# Patient Record
Sex: FEMALE | Race: WHITE | NOT HISPANIC OR LATINO | Employment: FULL TIME | URBAN - METROPOLITAN AREA
[De-identification: names, ages, dates, MRNs, and addresses within clinical notes are randomized per-mention and may not be internally consistent; named-entity substitution may affect disease eponyms.]

---

## 2018-06-02 ENCOUNTER — HOSPITAL ENCOUNTER (INPATIENT)
Facility: HOSPITAL | Age: 28
LOS: 3 days | Discharge: HOME | DRG: 440 | End: 2018-06-05
Attending: EMERGENCY MEDICINE | Admitting: INTERNAL MEDICINE
Payer: COMMERCIAL

## 2018-06-02 DIAGNOSIS — K85.90 ACUTE PANCREATITIS: Primary | ICD-10-CM

## 2018-06-02 PROBLEM — E87.6 HYPOKALEMIA: Status: ACTIVE | Noted: 2018-06-02

## 2018-06-02 LAB
ALBUMIN SERPL BCP-MCNC: 3.9 G/DL (ref 3.5–5)
ALP SERPL-CCNC: 78 U/L (ref 46–116)
ALT SERPL W P-5'-P-CCNC: 44 U/L (ref 12–78)
ANION GAP SERPL CALCULATED.3IONS-SCNC: 19 MMOL/L (ref 4–13)
AST SERPL W P-5'-P-CCNC: 51 U/L (ref 5–45)
BASOPHILS # BLD AUTO: 0.07 THOUSANDS/ΜL (ref 0–0.1)
BASOPHILS NFR BLD AUTO: 1 % (ref 0–1)
BILIRUB SERPL-MCNC: 0.5 MG/DL (ref 0.2–1)
BUN SERPL-MCNC: 24 MG/DL (ref 5–25)
CALCIUM SERPL-MCNC: 9.5 MG/DL (ref 8.3–10.1)
CHLORIDE SERPL-SCNC: 101 MMOL/L (ref 100–108)
CLARITY, POC: CLEAR
CO2 SERPL-SCNC: 21 MMOL/L (ref 21–32)
COLOR, POC: NORMAL
CREAT SERPL-MCNC: 0.98 MG/DL (ref 0.6–1.3)
EOSINOPHIL # BLD AUTO: 0.14 THOUSAND/ΜL (ref 0–0.61)
EOSINOPHIL NFR BLD AUTO: 1 % (ref 0–6)
ERYTHROCYTE [DISTWIDTH] IN BLOOD BY AUTOMATED COUNT: 14.6 % (ref 11.6–15.1)
EXT BILIRUBIN, UA: NORMAL
EXT BLOOD URINE: NORMAL
EXT GLUCOSE, UA: NORMAL
EXT KETONES: NORMAL
EXT NITRITE, UA: NORMAL
EXT PH, UA: 6.5
EXT PREG TEST URINE: NEGATIVE
EXT PROTEIN, UA: NORMAL
EXT SPECIFIC GRAVITY, UA: 1.01
EXT UROBILINOGEN: 0.2
GFR SERPL CREATININE-BSD FRML MDRD: 79 ML/MIN/1.73SQ M
GLUCOSE SERPL-MCNC: 130 MG/DL (ref 65–140)
HCT VFR BLD AUTO: 39.9 % (ref 34.8–46.1)
HGB BLD-MCNC: 13.1 G/DL (ref 11.5–15.4)
IMM GRANULOCYTES # BLD AUTO: 0.03 THOUSAND/UL (ref 0–0.2)
IMM GRANULOCYTES NFR BLD AUTO: 0 % (ref 0–2)
LIPASE SERPL-CCNC: ABNORMAL U/L (ref 73–393)
LYMPHOCYTES # BLD AUTO: 4.61 THOUSANDS/ΜL (ref 0.6–4.47)
LYMPHOCYTES NFR BLD AUTO: 34 % (ref 14–44)
MCH RBC QN AUTO: 30.9 PG (ref 26.8–34.3)
MCHC RBC AUTO-ENTMCNC: 32.8 G/DL (ref 31.4–37.4)
MCV RBC AUTO: 94 FL (ref 82–98)
MONOCYTES # BLD AUTO: 0.6 THOUSAND/ΜL (ref 0.17–1.22)
MONOCYTES NFR BLD AUTO: 4 % (ref 4–12)
NEUTROPHILS # BLD AUTO: 8.09 THOUSANDS/ΜL (ref 1.85–7.62)
NEUTS SEG NFR BLD AUTO: 60 % (ref 43–75)
NRBC BLD AUTO-RTO: 0 /100 WBCS
PLATELET # BLD AUTO: 541 THOUSANDS/UL (ref 149–390)
PMV BLD AUTO: 12.2 FL (ref 8.9–12.7)
POTASSIUM SERPL-SCNC: 3.1 MMOL/L (ref 3.5–5.3)
PROT SERPL-MCNC: 8.1 G/DL (ref 6.4–8.2)
RBC # BLD AUTO: 4.24 MILLION/UL (ref 3.81–5.12)
SODIUM SERPL-SCNC: 141 MMOL/L (ref 136–145)
WBC # BLD AUTO: 13.54 THOUSAND/UL (ref 4.31–10.16)
WBC # BLD EST: NORMAL 10*3/UL

## 2018-06-02 PROCEDURE — 81025 URINE PREGNANCY TEST: CPT | Performed by: EMERGENCY MEDICINE

## 2018-06-02 PROCEDURE — 99285 EMERGENCY DEPT VISIT HI MDM: CPT

## 2018-06-02 PROCEDURE — 99223 1ST HOSP IP/OBS HIGH 75: CPT | Performed by: NURSE PRACTITIONER

## 2018-06-02 PROCEDURE — 85025 COMPLETE CBC W/AUTO DIFF WBC: CPT | Performed by: EMERGENCY MEDICINE

## 2018-06-02 PROCEDURE — 81002 URINALYSIS NONAUTO W/O SCOPE: CPT | Performed by: EMERGENCY MEDICINE

## 2018-06-02 PROCEDURE — 80053 COMPREHEN METABOLIC PANEL: CPT | Performed by: EMERGENCY MEDICINE

## 2018-06-02 PROCEDURE — 36415 COLL VENOUS BLD VENIPUNCTURE: CPT

## 2018-06-02 PROCEDURE — 96374 THER/PROPH/DIAG INJ IV PUSH: CPT

## 2018-06-02 PROCEDURE — 83690 ASSAY OF LIPASE: CPT | Performed by: EMERGENCY MEDICINE

## 2018-06-02 RX ORDER — SODIUM CHLORIDE 9 MG/ML
150 INJECTION, SOLUTION INTRAVENOUS CONTINUOUS
Status: DISCONTINUED | OUTPATIENT
Start: 2018-06-02 | End: 2018-06-04

## 2018-06-02 RX ORDER — MELATONIN
1000 DAILY
Status: DISCONTINUED | OUTPATIENT
Start: 2018-06-03 | End: 2018-06-05 | Stop reason: HOSPADM

## 2018-06-02 RX ORDER — POTASSIUM CHLORIDE 14.9 MG/ML
20 INJECTION INTRAVENOUS
Status: COMPLETED | OUTPATIENT
Start: 2018-06-02 | End: 2018-06-04

## 2018-06-02 RX ORDER — ONDANSETRON 2 MG/ML
4 INJECTION INTRAMUSCULAR; INTRAVENOUS ONCE
Status: COMPLETED | OUTPATIENT
Start: 2018-06-02 | End: 2018-06-02

## 2018-06-02 RX ORDER — ONDANSETRON 2 MG/ML
4 INJECTION INTRAMUSCULAR; INTRAVENOUS EVERY 6 HOURS PRN
Status: DISCONTINUED | OUTPATIENT
Start: 2018-06-02 | End: 2018-06-05 | Stop reason: HOSPADM

## 2018-06-02 RX ORDER — TOPIRAMATE 25 MG/1
25 TABLET ORAL 2 TIMES DAILY
Status: DISCONTINUED | OUTPATIENT
Start: 2018-06-03 | End: 2018-06-04

## 2018-06-02 RX ORDER — TOPIRAMATE 100 MG/1
25 TABLET, FILM COATED ORAL 2 TIMES DAILY
COMMUNITY
End: 2018-06-05 | Stop reason: HOSPADM

## 2018-06-02 RX ORDER — DROSPIRENONE AND ETHINYL ESTRADIOL 0.02-3(28)
1 KIT ORAL DAILY
COMMUNITY
End: 2018-08-10 | Stop reason: HOSPADM

## 2018-06-02 RX ADMIN — ONDANSETRON 4 MG: 2 INJECTION, SOLUTION INTRAMUSCULAR; INTRAVENOUS at 21:56

## 2018-06-02 RX ADMIN — POTASSIUM CHLORIDE 20 MEQ: 200 INJECTION, SOLUTION INTRAVENOUS at 23:45

## 2018-06-02 RX ADMIN — SODIUM CHLORIDE 250 ML/HR: 0.9 INJECTION, SOLUTION INTRAVENOUS at 23:45

## 2018-06-02 RX ADMIN — ONDANSETRON 4 MG: 2 INJECTION, SOLUTION INTRAMUSCULAR; INTRAVENOUS at 19:48

## 2018-06-02 RX ADMIN — HYDROMORPHONE HYDROCHLORIDE 0.5 MG: 1 INJECTION, SOLUTION INTRAMUSCULAR; INTRAVENOUS; SUBCUTANEOUS at 21:56

## 2018-06-02 NOTE — ED PROVIDER NOTES
History  Chief Complaint   Patient presents with    Abdominal Pain     Pt presents to ED with central, upper abdominal pain  Pain started an hour and a half ago after eating  Pt reports N/V  Pt rates pain at 8 and states "it feels sharp "      This 52-year-old female complains of transient epigastric sharp pain that began about 1 hour ago  It is now resolved  She had nausea with the pain and had 1 episode of nonbloody vomiting after the pain started  He has no change in bowel movements  Last normal bowel movement was this afternoon  She denies recent fever cough dyspnea diarrhea dysuria and any other recent illness  She states the pain reminds her of an episode of pancreatitis she had 3 years ago  At that time she was told she has gallstones as well  She has not had these symptoms since then, until today            Prior to Admission Medications   Prescriptions Last Dose Informant Patient Reported? Taking? cholecalciferol (VITAMIN D3) 1,000 units tablet   Yes Yes   Sig: Take 1,000 Units by mouth daily   topiramate (TOPAMAX) 100 mg tablet   Yes Yes   Sig: Take 25 mg by mouth 2 (two) times a day      Facility-Administered Medications: None       History reviewed  No pertinent past medical history  History reviewed  No pertinent surgical history  History reviewed  No pertinent family history  I have reviewed and agree with the history as documented  Social History   Substance Use Topics    Smoking status: Never Smoker    Smokeless tobacco: Never Used    Alcohol use No        Review of Systems   Constitutional: Negative  HENT: Negative  Eyes: Negative  Respiratory: Negative  Cardiovascular: Negative  Gastrointestinal: Negative  Endocrine: Negative  Genitourinary: Negative  Musculoskeletal: Negative  Skin: Negative  Allergic/Immunologic: Negative  Neurological: Negative  Hematological: Negative  Psychiatric/Behavioral: Negative      All other systems reviewed and are negative  Physical Exam  Physical Exam   Constitutional: She is oriented to person, place, and time  She appears well-developed and well-nourished  HENT:   Head: Normocephalic and atraumatic  Eyes: Conjunctivae and EOM are normal  Pupils are equal, round, and reactive to light  Neck: Normal range of motion  Neck supple  Cardiovascular: Normal rate and regular rhythm  No murmur heard  Pulmonary/Chest: Effort normal and breath sounds normal    Abdominal: Soft  Bowel sounds are normal  She exhibits no distension and no mass  Tenderness:  very minimal epigastric tenderness  There is no rebound and no guarding  No hernia  Musculoskeletal: Normal range of motion  She exhibits no edema  Neurological: She is alert and oriented to person, place, and time  She has normal reflexes  No cranial nerve deficit  Coordination normal    Skin: Skin is warm and dry  No rash noted  Psychiatric: She has a normal mood and affect  Nursing note and vitals reviewed        Vital Signs  ED Triage Vitals   Temperature Pulse Respirations Blood Pressure SpO2   06/02/18 1927 06/02/18 1927 06/02/18 1927 06/02/18 1927 06/02/18 1927   (!) 97 1 °F (36 2 °C) 70 18 121/73 98 %      Temp Source Heart Rate Source Patient Position - Orthostatic VS BP Location FiO2 (%)   06/02/18 1927 06/02/18 2100 -- -- --   Temporal Monitor         Pain Score       06/02/18 2016       8           Vitals:    06/02/18 1930 06/02/18 2000 06/02/18 2030 06/02/18 2100   BP: 121/73 118/70 120/80 118/75   Pulse: 70 72 75 70       Visual Acuity  Visual Acuity      Most Recent Value   L Pupil Size (mm)  3   R Pupil Size (mm)  3          ED Medications  Medications   HYDROmorphone (DILAUDID) injection 0 5 mg (not administered)   ondansetron (ZOFRAN) injection 4 mg (not administered)   ondansetron (ZOFRAN) injection 4 mg (4 mg Intravenous Given 6/2/18 1948)       Diagnostic Studies  Results Reviewed     Procedure Component Value Units Date/Time    Lipase [[id number]]  (Abnormal) Collected:  06/02/18 1941    Lab Status:  Final result Specimen:  Blood from Arm, Left Updated:  06/02/18 2108     Lipase 26,408 (H) u/L     Comprehensive metabolic panel [[id number]]  (Abnormal) Collected:  06/02/18 1941    Lab Status:  Final result Specimen:  Blood from Arm, Left Updated:  06/02/18 2045     Sodium 141 mmol/L      Potassium 3 1 (L) mmol/L      Chloride 101 mmol/L      CO2 21 mmol/L      Anion Gap 19 (H) mmol/L      BUN 24 mg/dL      Creatinine 0 98 mg/dL      Glucose 130 mg/dL      Calcium 9 5 mg/dL      AST 51 (H) U/L      ALT 44 U/L      Alkaline Phosphatase 78 U/L      Total Protein 8 1 g/dL      Albumin 3 9 g/dL      Total Bilirubin 0 50 mg/dL      eGFR 79 ml/min/1 73sq m     Narrative:         National Kidney Disease Education Program recommendations are as follows:  GFR calculation is accurate only with a steady state creatinine  Chronic Kidney disease less than 60 ml/min/1 73 sq  meters  Kidney failure less than 15 ml/min/1 73 sq  meters      CBC and differential [[id number]]  (Abnormal) Collected:  06/02/18 1941    Lab Status:  Final result Specimen:  Blood from Arm, Left Updated:  06/02/18 2028     WBC 13 54 (H) Thousand/uL      RBC 4 24 Million/uL      Hemoglobin 13 1 g/dL      Hematocrit 39 9 %      MCV 94 fL      MCH 30 9 pg      MCHC 32 8 g/dL      RDW 14 6 %      MPV 12 2 fL      Platelets 115 (H) Thousands/uL      nRBC 0 /100 WBCs      Neutrophils Relative 60 %      Immat GRANS % 0 %      Lymphocytes Relative 34 %      Monocytes Relative 4 %      Eosinophils Relative 1 %      Basophils Relative 1 %      Neutrophils Absolute 8 09 (H) Thousands/µL      Immature Grans Absolute 0 03 Thousand/uL      Lymphocytes Absolute 4 61 (H) Thousands/µL      Monocytes Absolute 0 60 Thousand/µL      Eosinophils Absolute 0 14 Thousand/µL      Basophils Absolute 0 07 Thousands/µL     POCT urinalysis dipstick [[id number]]  (Normal) Resulted:  06/02/18 1953    Lab Status:  Final result Specimen:  Urine from Urine, Other Updated:  06/02/18 1954     Color, UA Hanna     Clarity, UA Clear     EXT Glucose, UA (Ref: Negative) Neg     EXT Bilirubin, UA (Ref: Negative) Neg     EXT Ketones, UA (Ref: Negative) Trace 5     EXT Spec Grav, UA 1 015     EXT Blood, UA (Ref: Negative) Trace     EXT pH, UA 6 5     EXT Protein, UA (Ref: Negative) Trace     EXT Urobilinogen, UA (Ref: 0 2- 1 0) 0 2     EXT Leukocytes, UA (Ref: Negative) Neg     EXT Nitrite, UA (Ref: Negative) Neg    POCT pregnancy, urine [[id number]]  (Normal) Resulted:  06/02/18 1953    Lab Status:  Final result Specimen:  Urine Updated:  06/02/18 1953     EXT PREG TEST UR (Ref: Negative) Negative                 No orders to display              Procedures  Procedures       Phone Contacts  ED Phone Contact    ED Course  ED Course as of Jun 02 2151   Sat Jun 02, 2018 2144   Pain is fluctuating  It was up to 8/10 and now she says it is approximately 3/10  She has nausea as well  I will admit her due to her symptoms and her elevated lipase                                MDM  Number of Diagnoses or Management Options  Acute pancreatitis: new and requires workup     Amount and/or Complexity of Data Reviewed  Clinical lab tests: ordered and reviewed  Discuss the patient with other providers: yes      CritCare Time    Disposition  Final diagnoses:   Acute pancreatitis     Time reflects when diagnosis was documented in both MDM as applicable and the Disposition within this note     Time User Action Codes Description Comment    6/2/2018  9:49 PM Dada Casillas Add [K85 90] Acute pancreatitis       ED Disposition     ED Disposition Condition Comment    Admit  Case was discussed with  NP and the patient's admission status was agreed to be Admission Status: inpatient status to the service of Dr Solmon Schwab           Follow-up Information    None         Patient's Medications   Discharge Prescriptions    No medications on file     No discharge procedures on file      ED Provider  Electronically Signed by           Sharmila Jensen DO  06/02/18 4536

## 2018-06-03 ENCOUNTER — APPOINTMENT (INPATIENT)
Dept: CT IMAGING | Facility: HOSPITAL | Age: 28
DRG: 440 | End: 2018-06-03
Payer: COMMERCIAL

## 2018-06-03 LAB
ANION GAP SERPL CALCULATED.3IONS-SCNC: 12 MMOL/L (ref 4–13)
BASOPHILS # BLD AUTO: 0.02 THOUSANDS/ΜL (ref 0–0.1)
BASOPHILS NFR BLD AUTO: 0 % (ref 0–1)
BUN SERPL-MCNC: 13 MG/DL (ref 5–25)
CALCIUM SERPL-MCNC: 8.6 MG/DL (ref 8.3–10.1)
CHLORIDE SERPL-SCNC: 109 MMOL/L (ref 100–108)
CO2 SERPL-SCNC: 20 MMOL/L (ref 21–32)
CREAT SERPL-MCNC: 0.83 MG/DL (ref 0.6–1.3)
EOSINOPHIL # BLD AUTO: 0.01 THOUSAND/ΜL (ref 0–0.61)
EOSINOPHIL NFR BLD AUTO: 0 % (ref 0–6)
ERYTHROCYTE [DISTWIDTH] IN BLOOD BY AUTOMATED COUNT: 14.4 % (ref 11.6–15.1)
GFR SERPL CREATININE-BSD FRML MDRD: 97 ML/MIN/1.73SQ M
GLUCOSE SERPL-MCNC: 101 MG/DL (ref 65–140)
GLUCOSE SERPL-MCNC: 108 MG/DL (ref 65–140)
GLUCOSE SERPL-MCNC: 66 MG/DL (ref 65–140)
GLUCOSE SERPL-MCNC: 74 MG/DL (ref 65–140)
GLUCOSE SERPL-MCNC: 76 MG/DL (ref 65–140)
GLUCOSE SERPL-MCNC: 88 MG/DL (ref 65–140)
HCT VFR BLD AUTO: 37.2 % (ref 34.8–46.1)
HGB BLD-MCNC: 12 G/DL (ref 11.5–15.4)
IMM GRANULOCYTES # BLD AUTO: 0.03 THOUSAND/UL (ref 0–0.2)
IMM GRANULOCYTES NFR BLD AUTO: 0 % (ref 0–2)
LYMPHOCYTES # BLD AUTO: 2.36 THOUSANDS/ΜL (ref 0.6–4.47)
LYMPHOCYTES NFR BLD AUTO: 20 % (ref 14–44)
MCH RBC QN AUTO: 30.8 PG (ref 26.8–34.3)
MCHC RBC AUTO-ENTMCNC: 32.3 G/DL (ref 31.4–37.4)
MCV RBC AUTO: 96 FL (ref 82–98)
MONOCYTES # BLD AUTO: 0.42 THOUSAND/ΜL (ref 0.17–1.22)
MONOCYTES NFR BLD AUTO: 4 % (ref 4–12)
NEUTROPHILS # BLD AUTO: 9.07 THOUSANDS/ΜL (ref 1.85–7.62)
NEUTS SEG NFR BLD AUTO: 76 % (ref 43–75)
NRBC BLD AUTO-RTO: 0 /100 WBCS
PLATELET # BLD AUTO: 419 THOUSANDS/UL (ref 149–390)
PMV BLD AUTO: 11.8 FL (ref 8.9–12.7)
POTASSIUM SERPL-SCNC: 4.3 MMOL/L (ref 3.5–5.3)
RBC # BLD AUTO: 3.89 MILLION/UL (ref 3.81–5.12)
SODIUM SERPL-SCNC: 141 MMOL/L (ref 136–145)
TRIGL SERPL-MCNC: 208 MG/DL
WBC # BLD AUTO: 11.91 THOUSAND/UL (ref 4.31–10.16)

## 2018-06-03 PROCEDURE — 74177 CT ABD & PELVIS W/CONTRAST: CPT

## 2018-06-03 PROCEDURE — 85025 COMPLETE CBC W/AUTO DIFF WBC: CPT | Performed by: NURSE PRACTITIONER

## 2018-06-03 PROCEDURE — C9113 INJ PANTOPRAZOLE SODIUM, VIA: HCPCS | Performed by: INTERNAL MEDICINE

## 2018-06-03 PROCEDURE — 82948 REAGENT STRIP/BLOOD GLUCOSE: CPT

## 2018-06-03 PROCEDURE — 80048 BASIC METABOLIC PNL TOTAL CA: CPT | Performed by: NURSE PRACTITIONER

## 2018-06-03 PROCEDURE — 84478 ASSAY OF TRIGLYCERIDES: CPT | Performed by: NURSE PRACTITIONER

## 2018-06-03 PROCEDURE — 99232 SBSQ HOSP IP/OBS MODERATE 35: CPT | Performed by: INTERNAL MEDICINE

## 2018-06-03 RX ORDER — MORPHINE SULFATE 2 MG/ML
2 INJECTION, SOLUTION INTRAMUSCULAR; INTRAVENOUS
Status: DISCONTINUED | OUTPATIENT
Start: 2018-06-03 | End: 2018-06-05 | Stop reason: HOSPADM

## 2018-06-03 RX ORDER — PANTOPRAZOLE SODIUM 40 MG/1
40 INJECTION, POWDER, FOR SOLUTION INTRAVENOUS
Status: DISCONTINUED | OUTPATIENT
Start: 2018-06-03 | End: 2018-06-04

## 2018-06-03 RX ADMIN — TOPIRAMATE 25 MG: 25 TABLET, FILM COATED ORAL at 09:00

## 2018-06-03 RX ADMIN — HYDROMORPHONE HYDROCHLORIDE 1 MG: 1 INJECTION, SOLUTION INTRAMUSCULAR; INTRAVENOUS; SUBCUTANEOUS at 09:00

## 2018-06-03 RX ADMIN — MORPHINE SULFATE 2 MG: 2 INJECTION, SOLUTION INTRAMUSCULAR; INTRAVENOUS at 13:44

## 2018-06-03 RX ADMIN — TOPIRAMATE 25 MG: 25 TABLET, FILM COATED ORAL at 21:18

## 2018-06-03 RX ADMIN — PANTOPRAZOLE SODIUM 40 MG: 40 INJECTION, POWDER, FOR SOLUTION INTRAVENOUS at 12:08

## 2018-06-03 RX ADMIN — SODIUM CHLORIDE 250 ML/HR: 0.9 INJECTION, SOLUTION INTRAVENOUS at 07:33

## 2018-06-03 RX ADMIN — IOHEXOL 100 ML: 350 INJECTION, SOLUTION INTRAVENOUS at 11:59

## 2018-06-03 RX ADMIN — POTASSIUM CHLORIDE 20 MEQ: 200 INJECTION, SOLUTION INTRAVENOUS at 01:36

## 2018-06-03 RX ADMIN — VITAMIN D, TAB 1000IU (100/BT) 1000 UNITS: 25 TAB at 08:57

## 2018-06-03 NOTE — PROGRESS NOTES
Progress Note - Jairo Blum 33 y o  female MRN: [id number]  Unit/Bed#: [address] [place] [address] 206-02 Encounter: [id number]    Assessment:  Principal Problem:    Acute pancreatitis  Active Problems:    Hypokalemia  Resolved Problems:    * No resolved hospital problems  *      Plan:  · Acute pancreatitis  Pain management  NPO  IV fluids  Will order CT abdomen pelvis with contrast   Patient has history of cholelithiasis in the past and had similar episode of pancreatitis about 3 years ago  GI consult  Will trend LFTs and lipase  · GI prophylaxis  · DVT prophylaxis-patient is ambulatory  · Discussed with patient and family in detail  Subjective:   Patient is seen and examined at bedside  Complains of abdominal pain which is epigastric in location to/10 intensity, sharp, constant, nonradiating  Denies any nausea or vomiting, fever, chills or any other associated symptoms  All other ROS are negative  Objective:   Vitals: Blood pressure 117/73, pulse 80, temperature 97 5 °F (36 4 °C), temperature source Oral, resp  rate 16, height 5' 3" (1 6 m), weight 71 7 kg (158 lb), last menstrual period 05/19/2018, SpO2 100 %  ,Body mass index is 27 99 kg/m²  SPO2 RA Rest      ED to Hosp-Admission (Current) from 6/2/2018 in [facility]   SpO2  100 %   SpO2 Activity     O2 Device  None (Room air)   O2 Flow Rate          I&O: No intake or output data in the 24 hours ending 06/03/18 1105    Physical Exam:    General- Alert, lying comfortably in bed  Not in any acute distress  HEENT- SHARI, EOM intact  Neck- Supple, No JVD  CVS- regular, S1 and S2 normal, No murmur  Chest- Bilateral Air entry, No rhochi, crackles or wheezing present  Abdomen- soft, nontender, not distended, no guarding or rigidity, BS+  Extremities-  No pedal edema, No calf tenderness                         Normal ROM in all extremities  CNS-   Alert, awake and orientedx3  No focal deficits present      Invasive Devices Peripheral Intravenous Line            Peripheral IV 06/02/18 Left Antecubital less than 1 day                      Social History  reviewed  Family History   Problem Relation Age of Onset    Hypertension Father     reviewed    Meds:  Current Facility-Administered Medications   Medication Dose Route Frequency Provider Last Rate Last Dose    cholecalciferol (VITAMIN D3) tablet 1,000 Units  1,000 Units Oral Daily CARLOS Jones   1,000 Units at 06/03/18 0857    morphine injection 2 mg  2 mg Intravenous Q3H PRN Nancy Sunshine MD        norgestrel-ethinyl estradiol (LO/OVRAL) 0 3 mg-30 mcg per tablet 1 tablet  1 tablet Oral Daily Luis Peng DO        ondansetron (ZOFRAN) injection 4 mg  4 mg Intravenous Q6H PRN CARLOS Jones        sodium chloride 0 9 % infusion  250 mL/hr Intravenous Continuous CARLOS Jones 250 mL/hr at 06/03/18 0733 250 mL/hr at 06/03/18 0733    topiramate (TOPAMAX) tablet 25 mg  25 mg Oral BID CARLOS Jones   25 mg at 06/03/18 0900      Prescriptions Prior to Admission   Medication    cholecalciferol (VITAMIN D3) 1,000 units tablet    drospirenone-ethinyl estradiol (CAROLYN) 3-0 02 MG per tablet    topiramate (TOPAMAX) 100 mg tablet       Labs:    Results from last 7 days  Lab Units 06/03/18  0533 06/02/18  1941   WBC Thousand/uL 11 91* 13 54*   HEMOGLOBIN g/dL 12 0 13 1   HEMATOCRIT % 37 2 39 9   PLATELETS Thousands/uL 419* 541*   NEUTROS PCT % 76* 60   LYMPHS PCT % 20 34   MONOS PCT % 4 4   EOS PCT % 0 1       Results from last 7 days  Lab Units 06/03/18  0533 06/02/18  1941   SODIUM mmol/L 141 141   POTASSIUM mmol/L 4 3 3 1*   CHLORIDE mmol/L 109* 101   CO2 mmol/L 20* 21   BUN mg/dL 13 24   CREATININE mg/dL 0 83 0 98   CALCIUM mg/dL 8 6 9 5   TOTAL PROTEIN g/dL  --  8 1   BILIRUBIN TOTAL mg/dL  --  0 50   ALK PHOS U/L  --  78   ALT U/L  --  44   AST U/L  --  51*   GLUCOSE RANDOM mg/dL 101 130     No results found for: TROPONINI, CKMB, CKTOTAL No results found for: Carolann Bob, SPUTUMCULTUR      Imaging:        Labs & Imaging: I have personally reviewed pertinent reports            Code Status:   Level 1 - Full Code      "This note has been constructed using a voice recognition system"      Vivienne Brown MD  6/3/2018,11:05 AM

## 2018-06-03 NOTE — CONSULTS
ASSESSMENT  Patient is a 52-year-old female with acute pancreatitis  This is her 2nd bout  She does not drink alcohol and her liver enzymes are essentially flat making a common duct stone unlikely  I would recommend rechecking her LFTs tomorrow with a spike up an MRCP would be in order  Assuming she does not have a common duct stone passed a retained as the cause of this, etiologies would be autoimmune viral, which seems unlikely given that she has had 2 episodes of idiopathic or a small pancreatic cyst obstructing the duct  If the etiology is not ascertain this admission she should have endoscopic ultrasound in 6-8 weeks to look for small pancreatic lesions    PLAN  IgG 4, serial LFTs MRI if her LFT spike up, consider an ultrasound of the gallbladder if they down  Consults    Chief Complaint   Patient presents with    Abdominal Pain     Pt presents to ED with central, upper abdominal pain  Pain started an hour and a half ago after eating  Pt reports N/V  Pt rates pain at 8 and states "it feels sharp "       HPI patient is a very pleasant 52-year-old female who reports she she was in her usual state of health until yesterday evening when she began to develop severe epigastric pain  The pain came in waves but then was constant was reminiscent of the pain she had with acute pancreatitis 3 years ago  She developed nausea and was transported to the hospital   The pain is somewhat better today but she is taking pain medication she thinks that is helping her  She denies any alcohol intake she is not a smoker    Past Medical History:   Diagnosis Date    Pancreatitis 2015       Past Surgical History:   Procedure Laterality Date    TONSILLECTOMY         @PTA    No Known Allergies    Social History     Family History   Problem Relation Age of Onset    Hypertension Father        Review of Systems - negative except as outlined in the HPI    Vitals:    06/03/18 0835   BP: 117/73   Pulse: 80   Resp: 16   Temp: 97 5 °F (36 4 °C)   SpO2: 100%       Physical Exam    Lab Results   Component Value Date    GLUCOSE 101 06/03/2018    CALCIUM 8 6 06/03/2018     06/03/2018    K 4 3 06/03/2018    CO2 20 (L) 06/03/2018     (H) 06/03/2018    BUN 13 06/03/2018    CREATININE 0 83 06/03/2018     Lab Results   Component Value Date    WBC 11 91 (H) 06/03/2018    HGB 12 0 06/03/2018    HCT 37 2 06/03/2018    MCV 96 06/03/2018     (H) 06/03/2018     Lab Results   Component Value Date    ALT 44 06/02/2018    AST 51 (H) 06/02/2018    ALKPHOS 78 06/02/2018    BILITOT 0 50 06/02/2018     No components found for: AMYLJKJJJASE  Lab Results   Component Value Date    LIPASE 26,408 (H) 06/02/2018     No results found for: IRON, TIBC, FERRITIN

## 2018-06-03 NOTE — H&P
H&P- Olvin Hebert 1990, 33 y o  female MRN: [id number]    Unit/Bed#: [address] [place] [address] Encounter: [id number]    Primary Care Provider: Rock Dalila DO   Date and time admitted to hospital: 6/2/2018  7:26 PM        * Acute pancreatitis   Assessment & Plan    Patient having severe epigastric pain  Lipase 26,408  Previous history of pancreatitis 3 years ago  Keep patient NPO  IV fluids at 250 mL/hour for hydration  Inpatient consult to GI  Ultrasound of gallbladder  Hypokalemia   Assessment & Plan    Potassium 3 1  Will replete with 40 mEq of IV potassium  VTE Prophylaxis: Pt is low risk for VTE  / sequential compression device   Code Status: Full code  POLST: There is no POLST form on file for this patient (pre-hospital)  Discussion with family: No family present    Anticipated Length of Stay:  Patient will be admitted on an Inpatient basis with an anticipated length of stay of  > 2 midnights  Justification for Hospital Stay: IV hydration    Total Time for Visit, including Counseling / Coordination of Care: 30 minutes  Greater than 50% of this total time spent on direct patient counseling and coordination of care  Chief Complaint:   Abdominal pain    History of Present Illness:    Olvin Hebert is a 33 y o  female with history of pancreatitis of unknown etiology 3 years ago and migraines who presents with complaints of 10/10 sharp epigastric pain  Patient states that pain began at 6:30 p m  soon after eating fish and cauliflower  Patient states she had a normal bowel movement and then vomited x1  Pain was relieved with Dilaudid  Patient is currently pain-free  Denies nausea, vomiting, or diarrhea  No fevers or chills  Patient denies drinking  On exam patient exhibiting mild epigastric tenderness with no rebound or guarding  Lipase 26,408, K 3 1, WBCs 13 54      Review of Systems:    Review of Systems   Constitutional: Negative for activity change, appetite change, chills and fever  Respiratory: Negative for apnea, cough and shortness of breath  Cardiovascular: Negative for chest pain, palpitations and leg swelling  Gastrointestinal: Positive for abdominal pain (severe 10/10 sharp pain) and vomiting  Negative for abdominal distention, constipation, diarrhea and nausea  Genitourinary: Negative for dysuria  Neurological: Negative for seizures, facial asymmetry, weakness, light-headedness, numbness and headaches  Psychiatric/Behavioral: Negative for agitation and confusion  The patient is not nervous/anxious  All other systems reviewed and are negative  Past Medical and Surgical History:     Past Medical History:   Diagnosis Date    Pancreatitis 2015       Past Surgical History:   Procedure Laterality Date    TONSILLECTOMY         Meds/Allergies:    Prior to Admission medications    Medication Sig Start Date End Date Taking? Authorizing Provider   cholecalciferol (VITAMIN D3) 1,000 units tablet Take 1,000 Units by mouth daily   Yes Historical Provider, MD   drospirenone-ethinyl estradiol (CAROLYN) 3-0 02 MG per tablet Take 1 tablet by mouth daily   Yes Historical Provider, MD   topiramate (TOPAMAX) 100 mg tablet Take 25 mg by mouth 2 (two) times a day   Yes Historical Provider, MD     I have reviewed home medications with patient personally      Allergies: No Known Allergies    Social History:     Marital Status: Single   Occupation: Works full time at [employer]  Patient Pre-hospital Living Situation: Lives with [facility]  Patient Pre-hospital Level of Mobility: Independent  Patient Pre-hospital Diet Restrictions: None  Substance Use History:   History   Alcohol Use    Yes     Comment: rarely     History   Smoking Status    Never Smoker   Smokeless Tobacco    Never Used     History   Drug Use No       Family History:    non-contributory    Physical Exam:     Vitals:   Blood Pressure: 118/76 (06/02/18 2215)  Pulse: 70 (06/02/18 2215)  Temperature: (!) 97 1 °F (36 2 °C) (06/02/18 1927)  Temp Source: Temporal (06/02/18 1927)  Respirations: 18 (06/02/18 2215)  Height: 5' 3" (160 cm) (06/02/18 1927)  Weight - Scale: 71 7 kg (158 lb) (06/02/18 1927)  SpO2: 100 % (06/02/18 2215)    Physical Exam   Constitutional: She is oriented to person, place, and time  She appears well-developed and well-nourished  No distress  HENT:   Head: Normocephalic and atraumatic  Eyes: EOM are normal  Pupils are equal, round, and reactive to light  Neck: Normal range of motion  Neck supple  Cardiovascular: Normal rate, regular rhythm, normal heart sounds and intact distal pulses  Exam reveals no gallop and no friction rub  No murmur heard  Pulmonary/Chest: Effort normal and breath sounds normal  No respiratory distress  She has no wheezes  She has no rales  Abdominal: Soft  Bowel sounds are normal  She exhibits no distension  There is tenderness in the epigastric area  There is no rebound and no guarding  Musculoskeletal: Normal range of motion  She exhibits no edema  Neurological: She is alert and oriented to person, place, and time  Skin: Skin is warm and dry  Psychiatric: She has a normal mood and affect  Judgment normal    Nursing note and vitals reviewed  Additional Data:     Lab Results: I have personally reviewed pertinent reports          Results from last 7 days  Lab Units 06/02/18 1941   WBC Thousand/uL 13 54*   HEMOGLOBIN g/dL 13 1   HEMATOCRIT % 39 9   PLATELETS Thousands/uL 541*   NEUTROS PCT % 60   LYMPHS PCT % 34   MONOS PCT % 4   EOS PCT % 1       Results from last 7 days  Lab Units 06/02/18 1941   SODIUM mmol/L 141   POTASSIUM mmol/L 3 1*   CHLORIDE mmol/L 101   CO2 mmol/L 21   BUN mg/dL 24   CREATININE mg/dL 0 98   CALCIUM mg/dL 9 5   TOTAL PROTEIN g/dL 8 1   BILIRUBIN TOTAL mg/dL 0 50   ALK PHOS U/L 78   ALT U/L 44   AST U/L 51*   GLUCOSE RANDOM mg/dL 130                   Imaging: No images noted    No orders to display       Allscripts / Epic Records Reviewed: Yes     ** Please Note: This note has been constructed using a voice recognition system   **

## 2018-06-03 NOTE — ASSESSMENT & PLAN NOTE
Patient having severe epigastric pain  Lipase 26,408  Previous history of pancreatitis 3 years ago  Keep patient NPO  IV fluids at 250 mL/hour for hydration  Inpatient consult to GI  Ultrasound of gallbladder

## 2018-06-04 ENCOUNTER — APPOINTMENT (INPATIENT)
Dept: ULTRASOUND IMAGING | Facility: HOSPITAL | Age: 28
DRG: 440 | End: 2018-06-04
Payer: COMMERCIAL

## 2018-06-04 LAB
ALBUMIN SERPL BCP-MCNC: 2.8 G/DL (ref 3.5–5)
ALP SERPL-CCNC: 74 U/L (ref 46–116)
ALT SERPL W P-5'-P-CCNC: 37 U/L (ref 12–78)
ANION GAP SERPL CALCULATED.3IONS-SCNC: 14 MMOL/L (ref 4–13)
AST SERPL W P-5'-P-CCNC: 28 U/L (ref 5–45)
BASOPHILS # BLD AUTO: 0.05 THOUSANDS/ΜL (ref 0–0.1)
BASOPHILS NFR BLD AUTO: 1 % (ref 0–1)
BILIRUB SERPL-MCNC: 0.4 MG/DL (ref 0.2–1)
BUN SERPL-MCNC: 7 MG/DL (ref 5–25)
CALCIUM SERPL-MCNC: 8.2 MG/DL (ref 8.3–10.1)
CHLORIDE SERPL-SCNC: 109 MMOL/L (ref 100–108)
CO2 SERPL-SCNC: 18 MMOL/L (ref 21–32)
CREAT SERPL-MCNC: 0.76 MG/DL (ref 0.6–1.3)
EOSINOPHIL # BLD AUTO: 0.7 THOUSAND/ΜL (ref 0–0.61)
EOSINOPHIL NFR BLD AUTO: 7 % (ref 0–6)
ERYTHROCYTE [DISTWIDTH] IN BLOOD BY AUTOMATED COUNT: 15 % (ref 11.6–15.1)
GFR SERPL CREATININE-BSD FRML MDRD: 108 ML/MIN/1.73SQ M
GLUCOSE SERPL-MCNC: 100 MG/DL (ref 65–140)
GLUCOSE SERPL-MCNC: 106 MG/DL (ref 65–140)
GLUCOSE SERPL-MCNC: 59 MG/DL (ref 65–140)
GLUCOSE SERPL-MCNC: 87 MG/DL (ref 65–140)
HCT VFR BLD AUTO: 33.8 % (ref 34.8–46.1)
HGB BLD-MCNC: 10.9 G/DL (ref 11.5–15.4)
IMM GRANULOCYTES # BLD AUTO: 0.02 THOUSAND/UL (ref 0–0.2)
IMM GRANULOCYTES NFR BLD AUTO: 0 % (ref 0–2)
LIPASE SERPL-CCNC: 3211 U/L (ref 73–393)
LYMPHOCYTES # BLD AUTO: 3.64 THOUSANDS/ΜL (ref 0.6–4.47)
LYMPHOCYTES NFR BLD AUTO: 36 % (ref 14–44)
MAGNESIUM SERPL-MCNC: 1.8 MG/DL (ref 1.6–2.6)
MCH RBC QN AUTO: 31.2 PG (ref 26.8–34.3)
MCHC RBC AUTO-ENTMCNC: 32.2 G/DL (ref 31.4–37.4)
MCV RBC AUTO: 97 FL (ref 82–98)
MONOCYTES # BLD AUTO: 0.53 THOUSAND/ΜL (ref 0.17–1.22)
MONOCYTES NFR BLD AUTO: 5 % (ref 4–12)
NEUTROPHILS # BLD AUTO: 5.26 THOUSANDS/ΜL (ref 1.85–7.62)
NEUTS SEG NFR BLD AUTO: 51 % (ref 43–75)
NRBC BLD AUTO-RTO: 0 /100 WBCS
PLATELET # BLD AUTO: 360 THOUSANDS/UL (ref 149–390)
PMV BLD AUTO: 11.9 FL (ref 8.9–12.7)
POTASSIUM SERPL-SCNC: 3.3 MMOL/L (ref 3.5–5.3)
PROT SERPL-MCNC: 6.1 G/DL (ref 6.4–8.2)
RBC # BLD AUTO: 3.49 MILLION/UL (ref 3.81–5.12)
SODIUM SERPL-SCNC: 141 MMOL/L (ref 136–145)
WBC # BLD AUTO: 10.2 THOUSAND/UL (ref 4.31–10.16)

## 2018-06-04 PROCEDURE — C9113 INJ PANTOPRAZOLE SODIUM, VIA: HCPCS | Performed by: INTERNAL MEDICINE

## 2018-06-04 PROCEDURE — 82787 IGG 1 2 3 OR 4 EACH: CPT | Performed by: INTERNAL MEDICINE

## 2018-06-04 PROCEDURE — 76705 ECHO EXAM OF ABDOMEN: CPT

## 2018-06-04 PROCEDURE — 83735 ASSAY OF MAGNESIUM: CPT | Performed by: INTERNAL MEDICINE

## 2018-06-04 PROCEDURE — 99232 SBSQ HOSP IP/OBS MODERATE 35: CPT | Performed by: INTERNAL MEDICINE

## 2018-06-04 PROCEDURE — 82784 ASSAY IGA/IGD/IGG/IGM EACH: CPT | Performed by: INTERNAL MEDICINE

## 2018-06-04 PROCEDURE — 80053 COMPREHEN METABOLIC PANEL: CPT | Performed by: INTERNAL MEDICINE

## 2018-06-04 PROCEDURE — 82948 REAGENT STRIP/BLOOD GLUCOSE: CPT

## 2018-06-04 PROCEDURE — 85025 COMPLETE CBC W/AUTO DIFF WBC: CPT | Performed by: INTERNAL MEDICINE

## 2018-06-04 PROCEDURE — 83690 ASSAY OF LIPASE: CPT | Performed by: INTERNAL MEDICINE

## 2018-06-04 RX ORDER — PANTOPRAZOLE SODIUM 40 MG/1
40 TABLET, DELAYED RELEASE ORAL
Status: DISCONTINUED | OUTPATIENT
Start: 2018-06-05 | End: 2018-06-05 | Stop reason: HOSPADM

## 2018-06-04 RX ORDER — DEXTROSE AND SODIUM CHLORIDE 5; .9 G/100ML; G/100ML
150 INJECTION, SOLUTION INTRAVENOUS CONTINUOUS
Status: DISCONTINUED | OUTPATIENT
Start: 2018-06-04 | End: 2018-06-04

## 2018-06-04 RX ORDER — DEXTROSE, SODIUM CHLORIDE, AND POTASSIUM CHLORIDE 5; .45; .15 G/100ML; G/100ML; G/100ML
100 INJECTION INTRAVENOUS CONTINUOUS
Status: DISCONTINUED | OUTPATIENT
Start: 2018-06-04 | End: 2018-06-05

## 2018-06-04 RX ORDER — ACETAMINOPHEN 325 MG/1
650 TABLET ORAL EVERY 6 HOURS PRN
Status: DISCONTINUED | OUTPATIENT
Start: 2018-06-04 | End: 2018-06-05 | Stop reason: HOSPADM

## 2018-06-04 RX ORDER — POTASSIUM CHLORIDE 20 MEQ/1
20 TABLET, EXTENDED RELEASE ORAL 2 TIMES DAILY
Status: DISCONTINUED | OUTPATIENT
Start: 2018-06-04 | End: 2018-06-05 | Stop reason: HOSPADM

## 2018-06-04 RX ADMIN — DEXTROSE, SODIUM CHLORIDE, AND POTASSIUM CHLORIDE 100 ML/HR: 5; .45; .15 INJECTION INTRAVENOUS at 10:50

## 2018-06-04 RX ADMIN — ACETAMINOPHEN 650 MG: 325 TABLET, FILM COATED ORAL at 06:03

## 2018-06-04 RX ADMIN — TOPIRAMATE 25 MG: 25 TABLET, FILM COATED ORAL at 08:13

## 2018-06-04 RX ADMIN — DEXTROSE, SODIUM CHLORIDE, AND POTASSIUM CHLORIDE 100 ML/HR: 5; .45; .15 INJECTION INTRAVENOUS at 18:14

## 2018-06-04 RX ADMIN — POTASSIUM CHLORIDE 20 MEQ: 1500 TABLET, EXTENDED RELEASE ORAL at 10:49

## 2018-06-04 RX ADMIN — VITAMIN D, TAB 1000IU (100/BT) 1000 UNITS: 25 TAB at 08:13

## 2018-06-04 RX ADMIN — DEXTROSE AND SODIUM CHLORIDE 150 ML/HR: 5; .9 INJECTION, SOLUTION INTRAVENOUS at 02:30

## 2018-06-04 RX ADMIN — PANTOPRAZOLE SODIUM 40 MG: 40 INJECTION, POWDER, FOR SOLUTION INTRAVENOUS at 08:13

## 2018-06-04 RX ADMIN — POTASSIUM CHLORIDE 20 MEQ: 1500 TABLET, EXTENDED RELEASE ORAL at 17:06

## 2018-06-04 NOTE — SOCIAL WORK
Met with Pt  Pt presents AA&Ox3  Discussed role of   Pt lives in apartment with fiance  Pt is independent with adls and ambulation  No DME  Pt goes to [facility] in [facility]  No needs anticipated  Will follow

## 2018-06-04 NOTE — PROGRESS NOTES
The pantoprazole has / have been converted to Oral per [facility] for Adults as approved by the Pharmacy and Therapeutics Committee  The patient met all eligible criteria:  3 Age = 25years old   2) Received at least one dose of the IV form   3) Receiving at least one other scheduled oral/enteral medication   4) Tolerating an oral/enteral diet   and did not have any exclusions:   1) Critical care patient   2) Active GI bleed (IF assessing H2RAs or PPIs)   3) Continuous tube feeding (IF assessing cipro, doxycycline, levofloxacin, minocycline, rifampin, or voriconazole)   4) Receiving PO vancomycin (IF assessing metronidazole)   5) Persistent nausea and/or vomiting   6) Ileus or gastrointestinal obstruction   7) Nichole/nasogastric tube set for continuous suction   8) Specific order not to automatically convert to PO (in the order's comments or if discussed in the most recent Infectious Disease or primary team's progress notes)

## 2018-06-04 NOTE — PROGRESS NOTES
Sabrina Jimenez  [id number]    33 y o   female      ASSESSMENT  Patient is a 81-year-old female with acute pancreatitis  This is her 2nd bout  She does not drink alcohol and her liver enzymes are essentially flat making a common duct stone unlikely  I would recommend rechecking her LFTs tomorrow with a spike up an MRCP would be in order  Assuming she does not have a common duct stone passed a retained as the cause of this, etiologies would be autoimmune viral, which seems unlikely given that she has had 2 episodes of idiopathic or a small pancreatic cyst obstructing the duct  If the etiology is not ascertain this admission she should have endoscopic ultrasound in 6-8 weeks to look for small pancreatic lesions  PLAN  1  Await IgG4 level to exclude autoimmune pancreatitis  2  Trend LFTs, amylase and lipase  3  Check an abdominal ultrasound today  4  Advise an endoscopic ultrasound to exclude small pancreatic lesions as an outpatient    Chief Complaint   Patient presents with    Abdominal Pain     Pt presents to ED with central, upper abdominal pain  Pain started an hour and a half ago after eating  Pt reports N/V  Pt rates pain at 8 and states "it feels sharp "       SUBJECTIVE/HPI   Denies any further abdominal pain  Denies any nausea, vomiting or any GI complaints      /62 (BP Location: Right arm)   Pulse 74   Temp 98 °F (36 7 °C) (Oral)   Resp 17   Ht 5' 3" (1 6 m)   Wt 73 4 kg (161 lb 13 1 oz)   LMP 05/19/2018 (Approximate)   SpO2 98%   BMI 28 66 kg/m²       PHYSICALEXAM  Constitutional:  Well developed, well nourished, no acute distress, non-toxic appearance   Eyes:   conjunctiva normal   HENT:  Atraumatic  Respiratory:  No respiratory distress  Cardiovascular:  Normal rate   GI:  Soft, nondistended, normal bowel sounds, nontender  Musculoskeletal:  No edema   Neurologic:  Alert & oriented x 3  Psychiatric:  Speech and behavior appropriate       Lab Results   Component Value Date GLUCOSE 87 06/04/2018    CALCIUM 8 2 (L) 06/04/2018     06/04/2018    K 3 3 (L) 06/04/2018    CO2 18 (L) 06/04/2018     (H) 06/04/2018    BUN 7 06/04/2018    CREATININE 0 76 06/04/2018     Lab Results   Component Value Date    WBC 10 20 (H) 06/04/2018    HGB 10 9 (L) 06/04/2018    HCT 33 8 (L) 06/04/2018    MCV 97 06/04/2018     06/04/2018     Lab Results   Component Value Date    ALT 37 06/04/2018    AST 28 06/04/2018    ALKPHOS 74 06/04/2018    BILITOT 0 40 06/04/2018     No results found for: AMYLASE  Lab Results   Component Value Date    LIPASE 3,211 (H) 06/04/2018     No results found for: IRON, TIBC, FERRITIN  No results found for: INR    Counseling / Coordination of Care  Total floor / unit time spent today 25 minutes  Greater than 50% of total time was spent with the patient and / or family counseling and / or coordination of care  A description of the counseling / coordination of care: 15    Virgil Courtney

## 2018-06-04 NOTE — PROGRESS NOTES
Progress Note - James Zuniga 33 y o  female MRN: [id number]    Unit/Bed#: [address] [place] [address] 206-02 Encounter: [id number]      Assessment:  Principal Problem:    Acute pancreatitis  Active Problems:    Hypokalemia  Resolved Problems:    * No resolved hospital problems  *        Plan:  1  Acute pancreatitis-patient had lipase level over 26,000 on presentation now down to 3211-for ultrasound today  GI follow-up Patient reports she had been started on Topamax for headache disorder by her primary physician I will stop that medication for now and I have discussed with pharmacy  2  Hypokalemia-had improved yesterday at 4 3 but now dropped to 3 3-will add in IV fluids as well as oral supplements  3  Headache disorder-will stop Topamax and monitor for any flare of symptoms    Subjective:   Patient with less abdominal pain and denies vomiting or lightheadedness this morning  She denies shortness of breath  ROS  Comprehensive system review negative other than noted above    Objective:     Vitals: Blood pressure 110/62, pulse 74, temperature 98 °F (36 7 °C), temperature source Oral, resp  rate 17, height 5' 3" (1 6 m), weight 73 4 kg (161 lb 13 1 oz), last menstrual period 05/19/2018, SpO2 98 %  ,Body mass index is 28 66 kg/m²    Current Facility-Administered Medications   Medication Dose Route Frequency Provider Last Rate Last Dose    acetaminophen (TYLENOL) tablet 650 mg  650 mg Oral Q6H PRN CARLOS Renee   650 mg at 06/04/18 0603    cholecalciferol (VITAMIN D3) tablet 1,000 Units  1,000 Units Oral Daily CARLOS Jones   1,000 Units at 06/04/18 0813    dextrose 5 % and sodium chloride 0 9 % infusion  150 mL/hr Intravenous Continuous CARLOS Jones 150 mL/hr at 06/04/18 0230 150 mL/hr at 06/04/18 0230    morphine injection 2 mg  2 mg Intravenous Q3H PRN Latoya Oliveros MD   2 mg at 06/03/18 1344    norgestrel-ethinyl estradiol (LO/OVRAL) 0 3 mg-30 mcg per tablet 1 tablet  1 tablet Oral Daily Orestes Parson DO        ondansetron [facility]) injection 4 mg  4 mg Intravenous Q6H PRN CARLOS Samano        pantoprazole (PROTONIX) injection 40 mg  40 mg Intravenous Q24H [address] Mikala Gutierres MD   40 mg at 06/04/18 0813    topiramate (TOPAMAX) tablet 25 mg  25 mg Oral BID CARLOS Samano   25 mg at 06/04/18 1317     Prescriptions Prior to Admission   Medication    cholecalciferol (VITAMIN D3) 1,000 units tablet    drospirenone-ethinyl estradiol (CAROLYN) 3-0 02 MG per tablet    topiramate (TOPAMAX) 100 mg tablet       No intake or output data in the 24 hours ending 06/04/18 0904    Physical Exam:  General appearance: alert and oriented, in no acute distress  Neck: no adenopathy, no JVD, supple, symmetrical, trachea midline and thyroid not enlarged, symmetric, no tenderness/mass/nodules  Lungs: clear to auscultation bilaterally  Heart: regular rate and rhythm, S1, S2 normal, no murmur, click, rub or gallop  Abdomen:  Mild midepigastric tenderness without guarding  Extremities: extremities normal, warm and well-perfused; no cyanosis, clubbing, or edema  Skin: Skin color, texture, turgor normal  No rashes or lesions  Neurologic:  No focal motor deficits  Speech is clear       Lab, Imaging and other studies: I have personally reviewed pertinent reports              Results from last 7 days  Lab Units 06/04/18 0537 06/03/18  0533 06/02/18 1941   WBC Thousand/uL 10 20* 11 91* 13 54*   HEMOGLOBIN g/dL 10 9* 12 0 13 1   HEMATOCRIT % 33 8* 37 2 39 9   PLATELETS Thousands/uL 360 419* 541*   NEUTROS PCT % 51 76* 60   LYMPHS PCT % 36 20 34   MONOS PCT % 5 4 4   EOS PCT % 7* 0 1       Results from last 7 days  Lab Units 06/04/18  0537 06/03/18  0533 06/02/18 1941   SODIUM mmol/L 141 141 141   POTASSIUM mmol/L 3 3* 4 3 3 1*   CHLORIDE mmol/L 109* 109* 101   CO2 mmol/L 18* 20* 21   BUN mg/dL 7 13 24   CREATININE mg/dL 0 76 0 83 0 98   CALCIUM mg/dL 8 2* 8 6 9 5   TOTAL PROTEIN g/dL 6 1*  --  8 1 BILIRUBIN TOTAL mg/dL 0 40  --  0 50   ALK PHOS U/L 74  --  78   ALT U/L 37  --  44   AST U/L 28  --  51*   GLUCOSE RANDOM mg/dL 87 101 130     No results found for: TROPONINI, CKMB, CKTOTAL      No results found for: Fatuma Jennings, SPUTUMCULTUR    Imaging:        PATIENT CENTERED ROUNDS: I have performed rounds with the nursing staff            Caesar Coyle DO

## 2018-06-04 NOTE — CASE MANAGEMENT
Initial Clinical Review  Admission: Date/Time/Statement: 6/2/18 @ 2151   Orders Placed This Encounter   Procedures    Inpatient Admission (expected length of stay for this patient is greater than two midnights)     Standing Status:   Standing     Number of Occurrences:   1     Order Specific Question:   Admitting Physician     Answer:   Leisa Jerome [[id number]]     Order Specific Question:   Level of Care     Answer:   Med Surg [[id number]]     Order Specific Question:   Estimated length of stay     Answer:   More than 2 Midnights     Order Specific Question:   Certification     Answer:   I certify that inpatient services are medically necessary for this patient for a duration of greater than two midnights  See H&P and MD Progress Notes for additional information about the patient's course of treatment  ED: Date/Time/Mode of Arrival:   ED Arrival Information     Expected Arrival Acuity Means of Arrival Escorted By Service Admission Type    - 6/2/2018 19:04 Urgent Walk[facility]In [facility] Urgent    Arrival Complaint    abdominal pain      Chief Complaint:   Chief Complaint   Patient presents with    Abdominal Pain     Pt presents to ED with central, upper abdominal pain  Pain started an hour and a half ago after eating  Pt reports N/V  Pt rates pain at 8 and states "it feels sharp "   History of Illness:   71-year-old female complains of transient epigastric sharp pain that began about 1 hour ago  She had nausea with the pain and had 1 episode of nonbloody vomiting after the pain started  Last normal bowel movement was this afternoon  She denies recent fever cough dyspnea diarrhea dysuria and any other recent illness  She states the pain reminds her of an episode of pancreatitis she had 3 years ago      ED Vital Signs:   ED Triage Vitals   Temperature Pulse Respirations Blood Pressure SpO2   06/02/18 1927 06/02/18 1927 06/02/18 1927 06/02/18 1927 06/02/18 1927   (!) 97 1 °F (36 2 °C) 70 18 121/73 98 %      Temp Source Heart Rate Source Patient Position - Orthostatic VS BP Location FiO2 (%)   06/02/18 1927 06/02/18 2100 06/03/18 0835 06/03/18 0835 --   Temporal Monitor Lying Right arm       Pain Score       06/02/18 2016       8        Wt Readings from Last 1 Encounters:   06/04/18 73 4 kg (161 lb 13 1 oz)   Vital Signs (abnormal): Abnormal Labs/Diagnostic Test Results:   WBC 13 54  K 3 1 ANION GAP 19 AST 51 LIPASE 26,408 TRIGLYC 208   CT A/P=1  Findings consistent with acute pancreatitis  No discrete pancreatic mass, evidence for necrosis, or dilatation of the main pancreatic duct  2   Mild ascites  3   No calcified gallstones or biliary ductal dilatation  ED Treatment:   Medication Administration from 06/02/2018 1904 to 06/02/2018 2243       Date/Time Order Dose Route Action Action by Comments     06/02/2018 1948 ondansetron (ZOFRAN) injection 4 mg 4 mg Intravenous Given Margie Geller RN      06/02/2018 2156 HYDROmorphone (DILAUDID) injection 0 5 mg 0 5 mg Intravenous Given Margie Geller RN      06/02/2018 2156 ondansetron (ZOFRAN) injection 4 mg 4 mg Intravenous Given Margie Geller RN       Past Medical/Surgical History: Active Ambulatory Problems     Diagnosis Date Noted    No Active Ambulatory Problems     Resolved Ambulatory Problems     Diagnosis Date Noted    No Resolved Ambulatory Problems     Past Medical History:   Diagnosis Date    Pancreatitis 2015   Admitting Diagnosis: Acute pancreatitis [K85 90]  Abdominal pain [R10 9]  Age/Sex: 33 y o  female  Assessment/Plan:   Acute pancreatitis   Assessment & Plan     Patient having severe epigastric pain  Lipase 26,408  Previous history of pancreatitis 3 years ago  Keep patient NPO  IV fluids at 250 mL/hour for hydration  Inpatient consult to GI  Ultrasound of gallbladder        Hypokalemia   Assessment & Plan     Potassium 3 1    Will replete with 40 mEq of IV potassium   Admission Orders:  MED SURG  NPO  CONSULT GI  ACCUCHECKS WITH COVERAGE SCALE  Scheduled Meds:   Current Facility-Administered Medications:  acetaminophen 650 mg Oral Q6H PRN CARLOS Palma    cholecalciferol 1,000 Units Oral Daily CARLOS Jones    dextrose 5 % and sodium chloride 0 9 % 150 mL/hr Intravenous Continuous CARLOS Jones Last Rate: 150 mL/hr (06/04/18 0230)   morphine injection 2 mg Intravenous Q3H PRN Ann Marion MD    norgestrel-ethinyl estradiol 1 tablet Oral Daily Artur Paiz DO    ondansetron 4 mg Intravenous Q6H PRN CARLOS Eli    pantoprazole 40 mg Intravenous Q24H [address] Ann Marion MD    topiramate 25 mg Oral BID CARLOS Eli      Continuous Infusions:   dextrose 5 % and sodium chloride 0 9 % 150 mL/hr Last Rate: 150 mL/hr (06/04/18 0230)     PRN Meds:   acetaminophen    morphine injection    ondansetron  Thank you,  [facility] in the [facility] by [facility] Hawkins for 2017  Network Utilization Review Department  Phone: [phone]; Fax [phone]  ATTENTION: The Network Utilization Review Department is now centralized for our 7 Facilities  Make a note that we have a new phone and fax numbers for our Department  Please call with any questions or concerns to [phone] and carefully follow the prompts so that you are directed to the right person  All voicemails are confidential  Fax any determinations, approvals, denials, and requests for initial or continue stay review clinical to [phone]  Due to HIGH CALL volume, it would be easier if you could please send faxed requests to expedite your requests and in part, help us provide discharge notifications faster

## 2018-06-05 VITALS
BODY MASS INDEX: 28.79 KG/M2 | HEART RATE: 75 BPM | SYSTOLIC BLOOD PRESSURE: 103 MMHG | WEIGHT: 162.48 LBS | DIASTOLIC BLOOD PRESSURE: 65 MMHG | HEIGHT: 63 IN | TEMPERATURE: 98.6 F | RESPIRATION RATE: 18 BRPM | OXYGEN SATURATION: 99 %

## 2018-06-05 PROBLEM — K85.00 IDIOPATHIC ACUTE PANCREATITIS WITHOUT INFECTION OR NECROSIS: Status: ACTIVE | Noted: 2018-06-02

## 2018-06-05 LAB
ALBUMIN SERPL BCP-MCNC: 3.3 G/DL (ref 3.5–5)
ALP SERPL-CCNC: 81 U/L (ref 46–116)
ALT SERPL W P-5'-P-CCNC: 41 U/L (ref 12–78)
ANION GAP SERPL CALCULATED.3IONS-SCNC: 13 MMOL/L (ref 4–13)
AST SERPL W P-5'-P-CCNC: 16 U/L (ref 5–45)
BILIRUB DIRECT SERPL-MCNC: 0.14 MG/DL (ref 0–0.2)
BILIRUB SERPL-MCNC: 0.4 MG/DL (ref 0.2–1)
BUN SERPL-MCNC: 5 MG/DL (ref 5–25)
CALCIUM SERPL-MCNC: 9.1 MG/DL (ref 8.3–10.1)
CHLORIDE SERPL-SCNC: 107 MMOL/L (ref 100–108)
CO2 SERPL-SCNC: 21 MMOL/L (ref 21–32)
CREAT SERPL-MCNC: 0.77 MG/DL (ref 0.6–1.3)
ERYTHROCYTE [DISTWIDTH] IN BLOOD BY AUTOMATED COUNT: 15 % (ref 11.6–15.1)
GFR SERPL CREATININE-BSD FRML MDRD: 106 ML/MIN/1.73SQ M
GLUCOSE SERPL-MCNC: 111 MG/DL (ref 65–140)
GLUCOSE SERPL-MCNC: 113 MG/DL (ref 65–140)
GLUCOSE SERPL-MCNC: 132 MG/DL (ref 65–140)
GLUCOSE SERPL-MCNC: 99 MG/DL (ref 65–140)
HCT VFR BLD AUTO: 37.9 % (ref 34.8–46.1)
HGB BLD-MCNC: 12.3 G/DL (ref 11.5–15.4)
IGG SERPL-MCNC: 714 MG/DL (ref 700–1600)
IGG1 SER-MCNC: 450 MG/DL (ref 248–810)
IGG2 SER-MCNC: 249 MG/DL (ref 130–555)
IGG3 SER-MCNC: 46 MG/DL (ref 15–102)
IGG4 SER-MCNC: 12 MG/DL (ref 2–96)
LIPASE SERPL-CCNC: 384 U/L (ref 73–393)
MCH RBC QN AUTO: 31.2 PG (ref 26.8–34.3)
MCHC RBC AUTO-ENTMCNC: 32.5 G/DL (ref 31.4–37.4)
MCV RBC AUTO: 96 FL (ref 82–98)
PLATELET # BLD AUTO: 374 THOUSANDS/UL (ref 149–390)
PMV BLD AUTO: 11.9 FL (ref 8.9–12.7)
POTASSIUM SERPL-SCNC: 4.5 MMOL/L (ref 3.5–5.3)
PROT SERPL-MCNC: 7.3 G/DL (ref 6.4–8.2)
RBC # BLD AUTO: 3.94 MILLION/UL (ref 3.81–5.12)
SODIUM SERPL-SCNC: 141 MMOL/L (ref 136–145)
WBC # BLD AUTO: 8.56 THOUSAND/UL (ref 4.31–10.16)

## 2018-06-05 PROCEDURE — 99238 HOSP IP/OBS DSCHRG MGMT 30/<: CPT | Performed by: INTERNAL MEDICINE

## 2018-06-05 PROCEDURE — 85027 COMPLETE CBC AUTOMATED: CPT | Performed by: INTERNAL MEDICINE

## 2018-06-05 PROCEDURE — 83690 ASSAY OF LIPASE: CPT | Performed by: INTERNAL MEDICINE

## 2018-06-05 PROCEDURE — 99232 SBSQ HOSP IP/OBS MODERATE 35: CPT | Performed by: INTERNAL MEDICINE

## 2018-06-05 PROCEDURE — 80048 BASIC METABOLIC PNL TOTAL CA: CPT | Performed by: INTERNAL MEDICINE

## 2018-06-05 PROCEDURE — 80076 HEPATIC FUNCTION PANEL: CPT | Performed by: INTERNAL MEDICINE

## 2018-06-05 PROCEDURE — 82948 REAGENT STRIP/BLOOD GLUCOSE: CPT

## 2018-06-05 RX ORDER — TOPIRAMATE 100 MG/1
100 TABLET, FILM COATED ORAL 2 TIMES DAILY
Status: DISCONTINUED | OUTPATIENT
Start: 2018-06-05 | End: 2018-06-05

## 2018-06-05 RX ORDER — TOPIRAMATE 100 MG/1
100 TABLET, FILM COATED ORAL 2 TIMES DAILY
Refills: 0 | Status: CANCELLED
Start: 2018-06-05

## 2018-06-05 RX ADMIN — PANTOPRAZOLE SODIUM 40 MG: 40 TABLET, DELAYED RELEASE ORAL at 05:53

## 2018-06-05 RX ADMIN — POTASSIUM CHLORIDE 20 MEQ: 1500 TABLET, EXTENDED RELEASE ORAL at 08:46

## 2018-06-05 RX ADMIN — DEXTROSE, SODIUM CHLORIDE, AND POTASSIUM CHLORIDE 100 ML/HR: 5; .45; .15 INJECTION INTRAVENOUS at 03:39

## 2018-06-05 RX ADMIN — VITAMIN D, TAB 1000IU (100/BT) 1000 UNITS: 25 TAB at 08:46

## 2018-06-05 NOTE — DISCHARGE SUMMARY
Discharge Summary - Kelby Odom 33 y o  female MRN: [id number]    Unit/Bed#: [address] [place] [address] Encounter: [id number]    Admission Date: 6/2/2018     Admitting Diagnosis: Acute pancreatitis [K85 90]  Abdominal pain [R10 9]    HPI: 32 yearold female admitted with abdominal pain and second episode of pancreatitis with elevated lipase over 25,000  Consults  gi  Procedures Performed: No orders of the defined types were placed in this encounter  Dalton Rogel was placed on iv fluids and gi rest with gradual improvement in pain and lipase returned to normal in am 6/5  She did tolerate low fat, soft diet and will e seen by gi for endoscopic us evalaution  Significant Findings, Care, Treatment and Services Provided:     Complications: none  Discharge Diagnosis: Principal Problem:    Idiopathic acute pancreatitis without infection or necrosis  Active Problems:    Hypokalemia        Condition at Discharge: good     Discharge instructions/Information to patient and family:   See after visit summary for information provided to patient and family  Provisions for Follow-Up Care:  See after visit summary for information related to follow-up care and any pertinent home health orders  Disposition: Home    Planned Readmission: No    Discharge Statement   I spent 25 minutes discharging the patient  Discharge Medications:  See after visit summary for reconciled discharge medications provided to patient and family          Crista Royal DO

## 2018-06-05 NOTE — PROGRESS NOTES
Ngoc Cooley  MR # [id number]  Unit/Bed#: [facility] 206-02  33 y o   female      ASSESSMENT:  Patient is a 52-year-old female with acute pancreatitis   This is her 2nd bout (first bout reportedly 3 years ago)   She does not drink alcohol and her liver enzymes are essentially flat making a common duct stone unlikely  US showed normal GB and CBD  Triglycerides are not significantly elevated and no OP medications would be contributing   Autoimmune serologies were drawn yesterday and still pending  Viral etiology is possible, but unlikely with previous episode  Having a small pancreatic lesion/cyst is possible and if no other cause found this admission, she should have an EUS in 6-8 weeks to further evaluate  I would recommend rechecking her LFTs today  If a spike in LFT is noted an MRCP would be in order  PLAN:  1  Await IgG4 level to exclude autoimmune pancreatitis- drawn 6/4 and pending   2  Trend LFTs, amylase and lipase- repeat today  3  Advise an endoscopic ultrasound in 6-8 weeks to exclude small pancreatic lesions as an outpatient     Chief Complaint   Patient presents with    Abdominal Pain     Pt presents to ED with central, upper abdominal pain  Pain started an hour and a half ago after eating  Pt reports N/V  Pt rates pain at 8 and states "it feels sharp "       SUBJECTIVE/HP:  Patient is awake in bed feeling well  She denies any pain and has not required pain management for at least a day or 2  She denies nausea and vomiting  She does report decreased appetite, but is tolerating clear liquid diet  She "feels great" and is hoping for d/c soon    /65 (BP Location: Right arm)   Pulse 75   Temp 98 6 °F (37 °C) (Oral)   Resp 18   Ht 5' 3" (1 6 m)   Wt 73 7 kg (162 lb 7 7 oz)   LMP 05/19/2018 (Approximate)   SpO2 99%   BMI 28 78 kg/m²     PHYSICALEXAM  General appearance: alert, appears stated age and cooperative,    Head: Normocephalic, without obvious abnormality, atraumatic  Lungs: CTA  Heart: regular rate and rhythm, S1,S2, no murmur,gallop or rub  Abdomen: soft, non-tender; bowel sounds normal; no masses,  no organomegaly  Extremities: +0 edema BL   Neurologic: awake and alert, nonfocal     Lab Results   Component Value Date    GLUCOSE 87 06/04/2018    CALCIUM 8 2 (L) 06/04/2018     06/04/2018    K 3 3 (L) 06/04/2018    CO2 18 (L) 06/04/2018     (H) 06/04/2018    BUN 7 06/04/2018    CREATININE 0 76 06/04/2018     Lab Results   Component Value Date    WBC 10 20 (H) 06/04/2018    HGB 10 9 (L) 06/04/2018    HCT 33 8 (L) 06/04/2018    MCV 97 06/04/2018     06/04/2018     Lab Results   Component Value Date    ALT 37 06/04/2018    AST 28 06/04/2018    ALKPHOS 74 06/04/2018    BILITOT 0 40 06/04/2018     No results found for: AMYLASE  Lab Results   Component Value Date    LIPASE 3,211 (H) 06/04/2018     No results found for: IRON, TIBC, FERRITIN  No results found for: [employer]  MR # [id number]  Unit/Bed#: [address] [place] [address]  33 y o   female

## 2018-06-05 NOTE — PROGRESS NOTES
Progress Note - Lili Siemens 33 y o  female MRN: [id number]    Unit/Bed#: [address] [place] [address] 206-02 Encounter: [id number]      Assessment:    Principal Problem:    Acute pancreatitis  Active Problems:    Hypokalemia  Resolved Problems:    * No resolved hospital problems  *      Plan:  Blood work has just been ordered  If this is improving as well as the patient tolerates increase diet she be discharged later today  Subjective:   No abdominal pain  Wants to eat  Would like to go home    Objective:     Vitals: Blood pressure 103/65, pulse 75, temperature 98 6 °F (37 °C), temperature source Oral, resp  rate 18, height 5' 3" (1 6 m), weight 73 7 kg (162 lb 7 7 oz), last menstrual period 05/19/2018, SpO2 99 %  ,Body mass index is 28 78 kg/m²  No intake or output data in the 24 hours ending 06/05/18 1961    Physical Exam:    Alert and awake in no acute distress  Lungs clear to auscultation bilaterally  Heart regular rate and rythm, normal heart sounds  Abdomen soft, active bowel sounds, non-tender, non-distended  Extremities: no cyanosis, clubbing or edema          Invasive Devices     Peripheral Intravenous Line            Peripheral IV 06/02/18 Left Antecubital 2 days                            Lab, Imaging and other studies: I have personally reviewed pertinent reports         Results from last 7 days  Lab Units 06/04/18 0537 06/03/18 0533 06/02/18  1941   WBC Thousand/uL 10 20* 11 91* 13 54*   HEMOGLOBIN g/dL 10 9* 12 0 13 1   HEMATOCRIT % 33 8* 37 2 39 9   PLATELETS Thousands/uL 360 419* 541*   NEUTROS PCT % 51 76* 60   LYMPHS PCT % 36 20 34   MONOS PCT % 5 4 4   EOS PCT % 7* 0 1       Results from last 7 days  Lab Units 06/04/18  0537 06/03/18  0533 06/02/18 1941   SODIUM mmol/L 141 141 141   POTASSIUM mmol/L 3 3* 4 3 3 1*   CHLORIDE mmol/L 109* 109* 101   CO2 mmol/L 18* 20* 21   BUN mg/dL 7 13 24   CREATININE mg/dL 0 76 0 83 0 98   CALCIUM mg/dL 8 2* 8 6 9 5   TOTAL PROTEIN g/dL 6 1*  --  8 1   BILIRUBIN TOTAL mg/dL 0 40  --  0 50   ALK PHOS U/L 74  --  78   ALT U/L 37  --  44   AST U/L 28  --  51*   GLUCOSE RANDOM mg/dL 87 101 130     No results found for: TROPONINI, CKMB, CKTOTAL      No results found for: Kathleen Dies, SPUTUMCULTUR    Imaging:        PATIENT CENTERED ROUNDS: I have performed rounds with the nursing staff  This note has been constructed using a voice recognition system      Dahiana Pepe MD

## 2018-07-30 ENCOUNTER — HOSPITAL ENCOUNTER (INPATIENT)
Facility: HOSPITAL | Age: 28
LOS: 1 days | Discharge: HOME | DRG: 440 | End: 2018-07-31
Attending: EMERGENCY MEDICINE | Admitting: INTERNAL MEDICINE
Payer: COMMERCIAL

## 2018-07-30 DIAGNOSIS — K85.90 RECURRENT ACUTE PANCREATITIS: ICD-10-CM

## 2018-07-30 DIAGNOSIS — K85.90 RECURRENT PANCREATITIS: Primary | ICD-10-CM

## 2018-07-30 LAB
ALBUMIN SERPL BCP-MCNC: 3.7 G/DL (ref 3.5–5)
ALP SERPL-CCNC: 71 U/L (ref 46–116)
ALT SERPL W P-5'-P-CCNC: 32 U/L (ref 12–78)
ANION GAP SERPL CALCULATED.3IONS-SCNC: 8 MMOL/L (ref 4–13)
AST SERPL W P-5'-P-CCNC: 46 U/L (ref 5–45)
BASOPHILS # BLD AUTO: 0.06 THOUSANDS/ΜL (ref 0–0.1)
BASOPHILS NFR BLD AUTO: 0 % (ref 0–1)
BILIRUB DIRECT SERPL-MCNC: 0.15 MG/DL (ref 0–0.2)
BILIRUB SERPL-MCNC: 0.4 MG/DL (ref 0.2–1)
BUN SERPL-MCNC: 11 MG/DL (ref 5–25)
CALCIUM SERPL-MCNC: 9.6 MG/DL (ref 8.3–10.1)
CHLORIDE SERPL-SCNC: 101 MMOL/L (ref 100–108)
CLARITY, POC: CLEAR
CO2 SERPL-SCNC: 28 MMOL/L (ref 21–32)
COLOR, POC: YELLOW
CREAT SERPL-MCNC: 0.83 MG/DL (ref 0.6–1.3)
EOSINOPHIL # BLD AUTO: 0.24 THOUSAND/ΜL (ref 0–0.61)
EOSINOPHIL NFR BLD AUTO: 1 % (ref 0–6)
ERYTHROCYTE [DISTWIDTH] IN BLOOD BY AUTOMATED COUNT: 12.9 % (ref 11.6–15.1)
EXT BILIRUBIN, UA: NORMAL
EXT BLOOD URINE: NORMAL
EXT GLUCOSE, UA: NORMAL
EXT KETONES: NORMAL
EXT NITRITE, UA: NORMAL
EXT PH, UA: 6
EXT PREG TEST URINE: NORMAL
EXT PROTEIN, UA: NORMAL
EXT SPECIFIC GRAVITY, UA: 1.01
EXT UROBILINOGEN: 0.2
GFR SERPL CREATININE-BSD FRML MDRD: 97 ML/MIN/1.73SQ M
GLUCOSE SERPL-MCNC: 111 MG/DL (ref 65–140)
HCT VFR BLD AUTO: 40.5 % (ref 34.8–46.1)
HGB BLD-MCNC: 13.4 G/DL (ref 11.5–15.4)
IMM GRANULOCYTES # BLD AUTO: 0.05 THOUSAND/UL (ref 0–0.2)
IMM GRANULOCYTES NFR BLD AUTO: 0 % (ref 0–2)
LIPASE SERPL-CCNC: ABNORMAL U/L (ref 73–393)
LYMPHOCYTES # BLD AUTO: 3.81 THOUSANDS/ΜL (ref 0.6–4.47)
LYMPHOCYTES NFR BLD AUTO: 23 % (ref 14–44)
MCH RBC QN AUTO: 31.5 PG (ref 26.8–34.3)
MCHC RBC AUTO-ENTMCNC: 33.1 G/DL (ref 31.4–37.4)
MCV RBC AUTO: 95 FL (ref 82–98)
MONOCYTES # BLD AUTO: 0.54 THOUSAND/ΜL (ref 0.17–1.22)
MONOCYTES NFR BLD AUTO: 3 % (ref 4–12)
NEUTROPHILS # BLD AUTO: 12.01 THOUSANDS/ΜL (ref 1.85–7.62)
NEUTS SEG NFR BLD AUTO: 73 % (ref 43–75)
NRBC BLD AUTO-RTO: 0 /100 WBCS
PLATELET # BLD AUTO: 363 THOUSANDS/UL (ref 149–390)
PMV BLD AUTO: 11.2 FL (ref 8.9–12.7)
POTASSIUM SERPL-SCNC: 3.9 MMOL/L (ref 3.5–5.3)
PROT SERPL-MCNC: 7.9 G/DL (ref 6.4–8.2)
RBC # BLD AUTO: 4.26 MILLION/UL (ref 3.81–5.12)
SODIUM SERPL-SCNC: 137 MMOL/L (ref 136–145)
WBC # BLD AUTO: 16.71 THOUSAND/UL (ref 4.31–10.16)
WBC # BLD EST: NORMAL 10*3/UL

## 2018-07-30 PROCEDURE — 85025 COMPLETE CBC W/AUTO DIFF WBC: CPT | Performed by: EMERGENCY MEDICINE

## 2018-07-30 PROCEDURE — 99284 EMERGENCY DEPT VISIT MOD MDM: CPT

## 2018-07-30 PROCEDURE — 36415 COLL VENOUS BLD VENIPUNCTURE: CPT | Performed by: EMERGENCY MEDICINE

## 2018-07-30 PROCEDURE — 83690 ASSAY OF LIPASE: CPT | Performed by: EMERGENCY MEDICINE

## 2018-07-30 PROCEDURE — 80076 HEPATIC FUNCTION PANEL: CPT | Performed by: EMERGENCY MEDICINE

## 2018-07-30 PROCEDURE — 81002 URINALYSIS NONAUTO W/O SCOPE: CPT | Performed by: EMERGENCY MEDICINE

## 2018-07-30 PROCEDURE — 81025 URINE PREGNANCY TEST: CPT | Performed by: EMERGENCY MEDICINE

## 2018-07-30 PROCEDURE — 99223 1ST HOSP IP/OBS HIGH 75: CPT | Performed by: INTERNAL MEDICINE

## 2018-07-30 PROCEDURE — 80048 BASIC METABOLIC PNL TOTAL CA: CPT | Performed by: EMERGENCY MEDICINE

## 2018-07-30 RX ORDER — DEXTROSE AND SODIUM CHLORIDE 5; .45 G/100ML; G/100ML
125 INJECTION, SOLUTION INTRAVENOUS CONTINUOUS
Status: DISCONTINUED | OUTPATIENT
Start: 2018-07-30 | End: 2018-07-31

## 2018-07-30 RX ORDER — ONDANSETRON 2 MG/ML
4 INJECTION INTRAMUSCULAR; INTRAVENOUS EVERY 6 HOURS PRN
Status: DISCONTINUED | OUTPATIENT
Start: 2018-07-30 | End: 2018-07-31 | Stop reason: HOSPADM

## 2018-07-31 ENCOUNTER — APPOINTMENT (INPATIENT)
Dept: ULTRASOUND IMAGING | Facility: HOSPITAL | Age: 28
DRG: 440 | End: 2018-07-31
Payer: COMMERCIAL

## 2018-07-31 VITALS
RESPIRATION RATE: 18 BRPM | TEMPERATURE: 97.4 F | WEIGHT: 144.18 LBS | HEART RATE: 59 BPM | SYSTOLIC BLOOD PRESSURE: 103 MMHG | OXYGEN SATURATION: 99 % | DIASTOLIC BLOOD PRESSURE: 61 MMHG | BODY MASS INDEX: 25.55 KG/M2 | HEIGHT: 63 IN

## 2018-07-31 LAB
ALBUMIN SERPL BCP-MCNC: 3.1 G/DL (ref 3.5–5)
ALP SERPL-CCNC: 62 U/L (ref 46–116)
ALT SERPL W P-5'-P-CCNC: 27 U/L (ref 12–78)
AMYLASE SERPL-CCNC: 580 IU/L (ref 25–115)
ANION GAP SERPL CALCULATED.3IONS-SCNC: 9 MMOL/L (ref 4–13)
AST SERPL W P-5'-P-CCNC: 25 U/L (ref 5–45)
BASOPHILS # BLD AUTO: 0.04 THOUSANDS/ΜL (ref 0–0.1)
BASOPHILS NFR BLD AUTO: 0 % (ref 0–1)
BILIRUB SERPL-MCNC: 0.4 MG/DL (ref 0.2–1)
BUN SERPL-MCNC: 7 MG/DL (ref 5–25)
CALCIUM SERPL-MCNC: 8.9 MG/DL (ref 8.3–10.1)
CHLORIDE SERPL-SCNC: 105 MMOL/L (ref 100–108)
CO2 SERPL-SCNC: 25 MMOL/L (ref 21–32)
CREAT SERPL-MCNC: 0.68 MG/DL (ref 0.6–1.3)
EOSINOPHIL # BLD AUTO: 0.2 THOUSAND/ΜL (ref 0–0.61)
EOSINOPHIL NFR BLD AUTO: 2 % (ref 0–6)
ERYTHROCYTE [DISTWIDTH] IN BLOOD BY AUTOMATED COUNT: 12.9 % (ref 11.6–15.1)
GFR SERPL CREATININE-BSD FRML MDRD: 120 ML/MIN/1.73SQ M
GLUCOSE SERPL-MCNC: 79 MG/DL (ref 65–140)
GLUCOSE SERPL-MCNC: 83 MG/DL (ref 65–140)
GLUCOSE SERPL-MCNC: 91 MG/DL (ref 65–140)
GLUCOSE SERPL-MCNC: 97 MG/DL (ref 65–140)
HCT VFR BLD AUTO: 36.3 % (ref 34.8–46.1)
HGB BLD-MCNC: 12.1 G/DL (ref 11.5–15.4)
IMM GRANULOCYTES # BLD AUTO: 0.03 THOUSAND/UL (ref 0–0.2)
IMM GRANULOCYTES NFR BLD AUTO: 0 % (ref 0–2)
LIPASE SERPL-CCNC: 3713 U/L (ref 73–393)
LYMPHOCYTES # BLD AUTO: 3.68 THOUSANDS/ΜL (ref 0.6–4.47)
LYMPHOCYTES NFR BLD AUTO: 37 % (ref 14–44)
MAGNESIUM SERPL-MCNC: 2 MG/DL (ref 1.6–2.6)
MCH RBC QN AUTO: 31.8 PG (ref 26.8–34.3)
MCHC RBC AUTO-ENTMCNC: 33.3 G/DL (ref 31.4–37.4)
MCV RBC AUTO: 95 FL (ref 82–98)
MONOCYTES # BLD AUTO: 0.57 THOUSAND/ΜL (ref 0.17–1.22)
MONOCYTES NFR BLD AUTO: 6 % (ref 4–12)
NEUTROPHILS # BLD AUTO: 5.55 THOUSANDS/ΜL (ref 1.85–7.62)
NEUTS SEG NFR BLD AUTO: 55 % (ref 43–75)
NRBC BLD AUTO-RTO: 0 /100 WBCS
PHOSPHATE SERPL-MCNC: 3.8 MG/DL (ref 2.7–4.5)
PLATELET # BLD AUTO: 321 THOUSANDS/UL (ref 149–390)
PMV BLD AUTO: 11 FL (ref 8.9–12.7)
POTASSIUM SERPL-SCNC: 4.1 MMOL/L (ref 3.5–5.3)
PROT SERPL-MCNC: 6.7 G/DL (ref 6.4–8.2)
RBC # BLD AUTO: 3.81 MILLION/UL (ref 3.81–5.12)
SODIUM SERPL-SCNC: 139 MMOL/L (ref 136–145)
TRIGL SERPL-MCNC: 204 MG/DL
WBC # BLD AUTO: 10.07 THOUSAND/UL (ref 4.31–10.16)

## 2018-07-31 PROCEDURE — 83735 ASSAY OF MAGNESIUM: CPT | Performed by: INTERNAL MEDICINE

## 2018-07-31 PROCEDURE — 99239 HOSP IP/OBS DSCHRG MGMT >30: CPT | Performed by: INTERNAL MEDICINE

## 2018-07-31 PROCEDURE — 99253 IP/OBS CNSLTJ NEW/EST LOW 45: CPT | Performed by: PHYSICIAN ASSISTANT

## 2018-07-31 PROCEDURE — 76705 ECHO EXAM OF ABDOMEN: CPT

## 2018-07-31 PROCEDURE — 80053 COMPREHEN METABOLIC PANEL: CPT | Performed by: INTERNAL MEDICINE

## 2018-07-31 PROCEDURE — 84478 ASSAY OF TRIGLYCERIDES: CPT | Performed by: INTERNAL MEDICINE

## 2018-07-31 PROCEDURE — 82948 REAGENT STRIP/BLOOD GLUCOSE: CPT

## 2018-07-31 PROCEDURE — 84100 ASSAY OF PHOSPHORUS: CPT | Performed by: INTERNAL MEDICINE

## 2018-07-31 PROCEDURE — 82150 ASSAY OF AMYLASE: CPT | Performed by: INTERNAL MEDICINE

## 2018-07-31 PROCEDURE — 85025 COMPLETE CBC W/AUTO DIFF WBC: CPT | Performed by: INTERNAL MEDICINE

## 2018-07-31 PROCEDURE — 83690 ASSAY OF LIPASE: CPT | Performed by: INTERNAL MEDICINE

## 2018-07-31 RX ORDER — SODIUM CHLORIDE 9 MG/ML
175 INJECTION, SOLUTION INTRAVENOUS CONTINUOUS
Status: DISCONTINUED | OUTPATIENT
Start: 2018-07-31 | End: 2018-07-31

## 2018-07-31 RX ORDER — MORPHINE SULFATE 4 MG/ML
4 INJECTION, SOLUTION INTRAMUSCULAR; INTRAVENOUS EVERY 4 HOURS PRN
Status: DISCONTINUED | OUTPATIENT
Start: 2018-07-30 | End: 2018-07-31 | Stop reason: HOSPADM

## 2018-07-31 RX ORDER — MORPHINE SULFATE 2 MG/ML
2 INJECTION, SOLUTION INTRAMUSCULAR; INTRAVENOUS EVERY 4 HOURS PRN
Status: DISCONTINUED | OUTPATIENT
Start: 2018-07-30 | End: 2018-07-31 | Stop reason: HOSPADM

## 2018-07-31 RX ADMIN — SODIUM CHLORIDE 175 ML/HR: 0.9 INJECTION, SOLUTION INTRAVENOUS at 00:19

## 2018-07-31 RX ADMIN — SODIUM CHLORIDE 175 ML/HR: 0.9 INJECTION, SOLUTION INTRAVENOUS at 06:07

## 2018-07-31 RX ADMIN — ENOXAPARIN SODIUM 40 MG: 40 INJECTION, SOLUTION INTRAVENOUS; SUBCUTANEOUS at 08:19

## 2018-07-31 RX ADMIN — DEXTROSE AND SODIUM CHLORIDE 125 ML/HR: 5; .45 INJECTION, SOLUTION INTRAVENOUS at 00:01

## 2018-07-31 NOTE — DISCHARGE SUMMARY
Discharge- Teresa Xavier 1990, 33 y o  female MRN: [id number]    Unit/Bed#: -01 Encounter: [id number]    Primary Care Provider: Jeaneth Fraga DO   Date and time admitted to hospital: 7/30/2018  8:58 PM        * Recurrent acute pancreatitis   Assessment & Plan    Patient admitted with recurrent pancreatitis  Her condition improved rapidly which was unexpected  She tolerated p o  food without any abdominal pain  Ultrasound the abdomen showed no cholelithiasis or biliary duct dilatation  She was seen by GI who will order an outpatient MRCP and follow up with a specialist in pancreatitis  Patient discharged stable condition home without any change in medications  On day of discharge patient's abdomen was soft and nontender, lungs were clear to auscultation, heart was regular rhythm    I discussed the case with Dr Jaclyn Millan from [address] Course:     Teresa Xavier is a 33 y o  female patient who originally presented to the hospital on   Admission Orders     Ordered        07/30/18 2235  Inpatient Admission (expected length of stay for this patient is greater than two midnights)  Once            due to abdominal pain    Please see above list of diagnoses and related plan for additional information  Condition at Discharge:  good      Discharge instructions/Information to patient and family:   See after visit summary for information provided to patient and family  Provisions for Follow-Up Care:  See after visit summary for information related to follow-up care and any pertinent home health orders  Disposition:     Home       Discharge Statement:  I spent 34 minutes discharging the patient  This time was spent on the day of discharge  I had direct contact with the patient on the day of discharge   Greater than 50% of the total time was spent examining patient, answering all patient questions, arranging and discussing plan of care with patient as well as directly providing post-discharge instructions  Additional time then spent on discharge activities  Discharge Medications:  See after visit summary for reconciled discharge medications provided to patient and family        ** Please Note: This note has been constructed using a voice recognition system **

## 2018-07-31 NOTE — PROGRESS NOTES
Progress Note - Tania Rivas 1990, 33 y o  female MRN: [id number]    Unit/Bed#: -01 Encounter: [id number]    Primary Care Provider: Siobhan Vazquez DO   Date and time admitted to hospital: 2018  8:58 PM        * Recurrent acute pancreatitis   Assessment & Plan    The patient this morning reports no nausea, abdominal pain or tenderness  Her lipase has decreased to 3700  She feels hungry  Will try her on clear liquids and bland diet see if she tolerates that without any nausea abdominal pain if she does tolerate diet she wishes to go home today if possible  Will await GI evaluation            VTE Prophylaxis: in place    Patient Centered Rounds: I rounded with patient's nurse    Current Length of Stay: 1 day(s)    Current Patient Status: Inpatient    Certification Statement: Pt requires additional inpatient hospital stay due to: see assessment and plan        Subjective:   Patient has severe epigastric pain resolved last night and had no recurrence abdominal pain since then  This morning denies any nausea, abdominal pain, chest pain or shortness of breath, pleurisy, dysuria, diarrhea    All other ROS are negative    Objective:     Vitals:   Temp (24hrs), Av 4 °F (36 9 °C), Min:98 2 °F (36 8 °C), Max:98 5 °F (36 9 °C)    HR:  [70-83] 70  Resp:  [16-18] 16  BP: (119-142)/(63-86) 119/63  SpO2:  [96 %-98 %] 97 %  Body mass index is 25 54 kg/m²  Input and Output Summary (last 24 hours): Intake/Output Summary (Last 24 hours) at 18 0946  Last data filed at 18 7575   Gross per 24 hour   Intake          1026 67 ml   Output              200 ml   Net           826 67 ml       Physical Exam:     Physical Exam   Constitutional: She appears well-developed  No distress  HENT:   Head: Normocephalic  Mouth/Throat: Oropharynx is clear and moist    Eyes: Conjunctivae are normal    Neck: Neck supple  Cardiovascular: Normal rate and regular rhythm      Pulmonary/Chest: Effort normal  No respiratory distress  She has no wheezes  She has no rales  Abdominal: Soft  Bowel sounds are normal  She exhibits no distension  There is no tenderness  Patient has no abdominal tenderness whatsoever   Musculoskeletal: She exhibits no tenderness  Lymphadenopathy:     She has no cervical adenopathy  Neurological: She is alert  No cranial nerve deficit  Skin: Skin is warm and dry  No rash noted  Psychiatric: She has a normal mood and affect  Vitals reviewed  I personally reviewed labs and imaging reports for today  Last 24 Hours Medication List:     Current Facility-Administered Medications:  enoxaparin 40 mg Subcutaneous Daily Hannah Hayes MD   morphine injection 4 mg Intravenous Q4H PRN Hannah Hayes MD   morphine injection 2 mg Intravenous Q4H PRN Hannah Hayes MD   ondansetron 4 mg Intravenous Q6H PRN Hananh Hayes MD          Today, Patient Was Seen By: Perla Haynes MD    ** Please Note: Dictation voice to text software may have been used in the creation of this document   **

## 2018-07-31 NOTE — ASSESSMENT & PLAN NOTE
The patient this morning reports no nausea, abdominal pain or tenderness  Her lipase has decreased to 3700  She feels hungry  Will try her on clear liquids and bland diet see if she tolerates that without any nausea abdominal pain if she does tolerate diet she wishes to go home today if possible      Will await GI evaluation

## 2018-07-31 NOTE — ASSESSMENT & PLAN NOTE
Patient admitted with recurrent pancreatitis  Her condition improved rapidly which was expected  She tolerated p o  food without any abdominal pain  Ultrasound the abdomen showed no cholelithiasis or biliary duct dilatation  She was seen by GI who will order an outpatient MRCP and follow up with a specialist in pancreatitis  Patient discharged stable condition home without any change in medications  On day of discharge patient's abdomen was soft and nontender, lungs were clear to auscultation, heart was regular rhythm        I discussed the case with Dr Gideon Angela from GI

## 2018-07-31 NOTE — CONSULTS
Consultation - General Surgery   Gerardo Sloares 33 y o  female MRN: [id number]  Unit/Bed#: -01 Encounter: [id number]    Assessment/Plan     Assessment/Plan:  Recurrent acute pancreatitis- etiology unknown  -CT on this admission with possible findings of gallbladder sludge  No other source identified on this admission and previous workups   -Would recommend MRCP as an outpatient    -If MRCP is negative would proceed with elective laparoscopic cholecystectomy  Obviously if MRCP is positive patient would need ERCP prior to any other procedures   -Plan has been discussed with the patient and she is in agreement   -She will call the office to schedule follow-up after her MRCP completed  -She will continue to follow up with the Gastroenterology service as well   -She will return to the emergency department with any recurrent symptoms  History of Present Illness     HPI:  Gerardo Solares is a 33 y o  female who was admitted from the emergency department last evening with severe upper abdominal pain  She complained of associated nausea  She denies any fevers, chills, change in bowel habits, or unintentional weight loss  CT scan confirmed acute pancreatitis  Patient has had 3 episodes of pancreatitis over the past 3 years  Her most recent episode was in June of this year  Workup did not reveal etiology  Her abdominal CT scan and ultrasounds have been negative for gallstones  Her most recent CT scan suggests possible sludge  Her LFTs and bilirubin have not been elevated  Autoimmune pancreatitis was ruled out by a serology studies  She does not have significant hypertriglyceridemia  She has not started new medications  She does not drink alcohol  Her symptoms have improved significantly today  Her lab work has improved  Her pain is resolved  She is being discharged today      Consults    Review of Systems   Constitutional: Negative for appetite change, chills, fever and unexpected weight change  HENT: Negative  Eyes: Negative  Respiratory: Negative  Negative for cough, chest tightness, shortness of breath and wheezing  Cardiovascular: Negative for chest pain, palpitations and leg swelling  Gastrointestinal: Positive for abdominal pain and nausea  Negative for abdominal distention, blood in stool, constipation, diarrhea and vomiting  Endocrine: Negative  Genitourinary: Negative  Negative for difficulty urinating, dysuria and hematuria  Musculoskeletal: Negative  Skin: Negative  Negative for color change, rash and wound  Neurological: Negative  Hematological: Negative  Psychiatric/Behavioral: Negative          Historical Information   Past Medical History:   Diagnosis Date    Pancreatitis 2015     Past Surgical History:   Procedure Laterality Date    TONSILLECTOMY       Social History   History   Alcohol Use    Yes     Comment: rarely     History   Drug Use No     History   Smoking Status    Never Smoker   Smokeless Tobacco    Never Used     Family History: non-contributory    Meds/Allergies   all current active meds have been reviewed  No Known Allergies    Objective   First Vitals:   Blood Pressure: 134/86 (07/30/18 2100)  Pulse: 77 (07/30/18 2100)  Temperature: 98 5 °F (36 9 °C) (07/30/18 2100)  Temp Source: Temporal (07/30/18 2100)  Respirations: 18 (07/30/18 2100)  Height: 5' 3" (160 cm) (07/30/18 2100)  Weight - Scale: 65 8 kg (145 lb) (07/30/18 2100)  SpO2: 96 % (07/30/18 2100)    Current Vitals:   Blood Pressure: 103/61 (07/31/18 1047)  Pulse: 59 (07/31/18 1047)  Temperature: (!) 97 4 °F (36 3 °C) (07/31/18 1047)  Temp Source: Oral (07/31/18 1047)  Respirations: 18 (07/31/18 1047)  Height: 5' 3" (160 cm) (07/30/18 2318)  Weight - Scale: 65 4 kg (144 lb 2 9 oz) (07/31/18 0608)  SpO2: 99 % (07/31/18 1047)      Intake/Output Summary (Last 24 hours) at 07/31/18 1551  Last data filed at 07/31/18 1236   Gross per 24 hour   Intake          1964 17 ml   Output 200 ml   Net          1764 17 ml       Invasive Devices          No matching active lines, drains, or airways          Physical Exam   Constitutional: She is oriented to person, place, and time  She appears well-developed and well-nourished  No distress  HENT:   Head: Normocephalic and atraumatic  Mouth/Throat: Oropharynx is clear and moist  No oropharyngeal exudate  Eyes: EOM are normal  Right eye exhibits no discharge  Left eye exhibits no discharge  No scleral icterus  Neck: Neck supple  No JVD present  No tracheal deviation present  No thyromegaly present  Cardiovascular: Normal rate, regular rhythm and normal heart sounds  No murmur heard  Pulmonary/Chest: Breath sounds normal  No respiratory distress  She has no wheezes  She has no rales  Abdominal: Soft  Bowel sounds are normal  She exhibits no distension  There is no tenderness  Musculoskeletal: Normal range of motion  She exhibits no edema or deformity  Neurological: She is alert and oriented to person, place, and time  No focal deficits   Skin: Skin is warm and dry  No rash noted  She is not diaphoretic  No pallor  No jaundice   Psychiatric: She has a normal mood and affect  Her behavior is normal        Lab Results:   I have personally reviewed pertinent lab results    , CBC:   Lab Results   Component Value Date    WBC 10 07 07/31/2018    HGB 12 1 07/31/2018    HCT 36 3 07/31/2018    MCV 95 07/31/2018     07/31/2018    MCH 31 8 07/31/2018    MCHC 33 3 07/31/2018    RDW 12 9 07/31/2018    MPV 11 0 07/31/2018    NRBC 0 07/31/2018   , CMP:   Lab Results   Component Value Date     07/31/2018    K 4 1 07/31/2018     07/31/2018    CO2 25 07/31/2018    ANIONGAP 9 07/31/2018    BUN 7 07/31/2018    CREATININE 0 68 07/31/2018    GLUCOSE 83 07/31/2018    CALCIUM 8 9 07/31/2018    AST 25 07/31/2018    ALT 27 07/31/2018    ALKPHOS 62 07/31/2018    PROT 6 7 07/31/2018    BILITOT 0 40 07/31/2018    EGFR 120 07/31/2018 , Coagulation: No results found for: PT, INR, APTT, Urinalysis:   Lab Results   Component Value Date    COLORU yellow 07/30/2018    CLARITYU clear 07/30/2018   , Amylase:   Lab Results   Component Value Date    AMYLASE 580 ([facility]) 07/31/2018   , Lipase:   Lab Results   Component Value Date    LIPASE 3,713 (H) 07/31/2018     Imaging: I have personally reviewed pertinent reports  EKG, Pathology, and Other Studies: I have personally reviewed pertinent reports        Frank Silva PA-C

## 2018-07-31 NOTE — CONSULTS
James Zuniga  [id number]    33 y o   female      ASSESSMENT  1  Acute pancreatitis, recurrent  Idiopathic  This constitutes her 3rd episode over the past 3 years  Recent hospitalization in June for the same without any etiology  Denies alcohol or smoking  Abdominal ultrasound negative for stones  CT scan of the abdomen pelvis on last admission which showed acute uncomplicated pancreatitis  LFTs never significantly elevated  Autoimmune pancreatitis has been excluded via serology, as well as hypercalcemia  Triglycerides are slightly elevated but not to the extent that would cause pancreatitis  No viral prodrome  No offending medication   Possibly ache passed common bile duct stone or small pancreatic cyst obstructing the duct  Has not had any further abdominal pain since admission to the ER last evening  PLAN  1  Agree with advancing diet to bland as pain has totally resolved  2  Follow-up as an outpatient and most likely will arrange for an endoscopic ultrasound to exclude small pancreatic lesions, Dr Jj Medina had advised on last hospitalization    Chief Complaint   Patient presents with    Abdominal Pain     Pt c/o upper abd pain and nausea that started today  HPI   16-year-old female with a history of idiopathic pancreatitis, was recently hospitalized for this in June of this year without any etiology  Since that time, had experienced a mild recurrence of epigastric pain that resolved spontaneously  Last night, however she developed recurrent, localized epigastric pain a few hours following a dinner at that consisted of scrambled eggs, tomato and cottage cheese  Associated nausea without vomiting  Denies any fevers, chills, diarrhea, melena, rectal bleeding or change in bowels  Abdominal pain resolved entirely an hour or two after she was admitted to the ER  Denies any new medications with the exception of a change in her oral contraception in March or April of this year  Intentional weight loss of 40 lb over the past 2 years  Denies any heartburn, early satiety  Denies any jaundice      Past Medical History:   Diagnosis Date    Pancreatitis 2015       Past Surgical History:   Procedure Laterality Date    TONSILLECTOMY         Prescriptions Prior to Admission   Medication    cholecalciferol (VITAMIN D3) 1,000 units tablet    drospirenone-ethinyl estradiol (CAROLYN) 3-0 02 MG per tablet       No Known Allergies    Social History   Substance Use Topics    Smoking status: Never Smoker    Smokeless tobacco: Never Used    Alcohol use Yes      Comment: rarely       Family History   Problem Relation Age of Onset    Hypertension Father        Review of Systems  General ROS: negative for weight loss, fever, night sweats  Psychological ROS: negative for depression or anxiety  Ophthalmic ROS: negative for any eye issues  ENT ROS: no scleral icterus  Hematological and Lymphatic: no issues with bleeding or adenopathy  Respiratory ROS: no chest pain, shortness of breath or cough  Cardiovascular ROS: no issues with chest pain, heart palpitations  Gastrointestinal ROS: no issues with nausea, vomiting, diarrhea + abdominal pain  Genito-Urinary ROS: no issues with urinary burning, urinary frequency or hematuria  Neurological ROS:  no asterixis  Dermatological ROS: no skin rashes or lesions        /63   Pulse 70   Temp 98 5 °F (36 9 °C)   Resp 16   Ht 5' 3" (1 6 m)   Wt 65 4 kg (144 lb 2 9 oz)   LMP 07/04/2018   SpO2 97%   BMI 25 54 kg/m²       Physical Exam     Constitutional:  Well developed, well nourished, no acute distress, non-toxic appearance   Eyes:  conjunctiva normal   HENT:  Atraumatic  Respiratory:  No respiratory distress  Cardiovascular:  Normal rate   GI:  Soft, nondistended, normal bowel sounds, nontender  Musculoskeletal:  No edema  Neurologic:  Alert & oriented x 3  Psychiatric:  Speech and behavior appropriate               Lab Results   Component Value Date GLUCOSE 83 07/31/2018    CALCIUM 8 9 07/31/2018     07/31/2018    K 4 1 07/31/2018    CO2 25 07/31/2018     07/31/2018    BUN 7 07/31/2018    CREATININE 0 68 07/31/2018     Lab Results   Component Value Date    WBC 10 07 07/31/2018    HGB 12 1 07/31/2018    HCT 36 3 07/31/2018    MCV 95 07/31/2018     07/31/2018     Lab Results   Component Value Date    ALT 27 07/31/2018    AST 25 07/31/2018    ALKPHOS 62 07/31/2018    BILITOT 0 40 07/31/2018     Lab Results   Component Value Date    AMYLASE 580 ([place]) 07/31/2018     Lab Results   Component Value Date    LIPASE 3,713 (H) 07/31/2018     No results found for: IRON, TIBC, FERRITIN  No results found for: INR        No results found  Counseling / Coordination of Care  Total floor / unit time spent today 25 minutes  Greater than 50% of total time was spent with the patient and / or family counseling and / or coordination of care  A description of the counseling / coordination of care: 15      Pretty Lezama

## 2018-07-31 NOTE — CASE MANAGEMENT
Initial Clinical Review  Admission: Date/Time/Statement: 7/30/18 @ 2235   Orders Placed This Encounter   Procedures    Inpatient Admission (expected length of stay for this patient is greater than two midnights)     Standing Status:   Standing     Number of Occurrences:   1     Order Specific Question:   Admitting Physician     Answer:   Sandre Mcardle [[id number]]     Order Specific Question:   Level of Care     Answer:   Med Surg [[id number]]     Order Specific Question:   Estimated length of stay     Answer:   More than 2 Midnights     Order Specific Question:   Certification     Answer:   I certify that inpatient services are medically necessary for this patient for a duration of greater than two midnights  See H&P and MD Progress Notes for additional information about the patient's course of treatment  ED: Date/Time/Mode of Arrival:   ED Arrival Information     Expected Arrival Acuity Means of Arrival Escorted By Service Admission Type    - 7/30/2018 20:53 Urgent Walk-In Friend General Medicine Urgent    Arrival Complaint    GALL BLADDER PAIN      Chief Complaint:   Chief Complaint   Patient presents with    Abdominal Pain     Pt c/o upper abd pain and nausea that started today  History of Illness:   81-year-old female with history of idiopathic pancreatitis and no prior surgeries complains of right upper quadrant pain  She states this started an hour ago and was coming in waves  She had slight nausea but no vomiting  Patient states this feels like prior episode of pancreatitis although she also states that she was told she should have her gallbladder taken out  She says gallbladder ultrasound and CT never showed any gallbladder stones      ED Vital Signs:   ED Triage Vitals [07/30/18 2100]   Temperature Pulse Respirations Blood Pressure SpO2   98 5 °F (36 9 °C) 77 18 134/86 96 %      Temp Source Heart Rate Source Patient Position - Orthostatic VS BP Location FiO2 (%)   Temporal Monitor Lying Right arm --      Pain Score       9        Wt Readings from Last 1 Encounters:   07/31/18 65 4 kg (144 lb 2 9 oz)   Vital Signs (abnormal): Abnormal Labs/Diagnostic Test Results:   WBC 16 71 LIPASE 20,176  ED Treatment:   Medication Administration from 07/30/2018 2053 to 07/30/2018 2315     None      Past Medical/Surgical History: Active Ambulatory Problems     Diagnosis Date Noted    Idiopathic acute pancreatitis without infection or necrosis 06/02/2018    Hypokalemia 06/02/2018     Resolved Ambulatory Problems     Diagnosis Date Noted    No Resolved Ambulatory Problems     Past Medical History:   Diagnosis Date    Pancreatitis 2015   Admitting Diagnosis: Recurrent pancreatitis ([facility] [address] ) [K86 1]  Gall bladder pain [K80 50]  Age/Sex: 33 y o  female  Assessment/Plan:   Recurrent acute pancreatitis  Plan:  Admit for further evaluation of acute pancreatitis  Place on NPO status with generous IV hydration  Provide intravenous Zofran for nausea and vomiting and IV morphine for pain relief  Consult GI service for evaluation request ultrasound of the gallbladder for further evaluation  May require further evaluation with MRCP vs ERCP and would benefit from general surgical consult  Place on IV Protonix for for GI prophylaxis  Further management to be determined by GI and general surgical service  Admission Orders:  MED SURG  CONSULT GI  ACCUCHECKS WITH COVERAGE SCALE  VENODYNES  CONSULT SURGERY  Scheduled Meds:   Current Facility-Administered Medications:  enoxaparin 40 mg Subcutaneous Daily Clayton Scherer MD   morphine injection 4 mg Intravenous Q4H PRN Clayton Scherer MD   morphine injection 2 mg Intravenous Q4H PRN Clayton Scherer MD   ondansetron 4 mg Intravenous Q6H PRN Clayton Scherer MD   Continuous Infusions:  IVF D5NS @ 175CC/HR    PRN Meds: morphine injection    morphine injection    ondansetron  Thank you,  Fish Aqq  291 Utilization Review Department  Phone: [phone];  Fax [phone]  ATTENTION: The Network Utilization Review Department is now centralized for our 9 Facilities  Make a note that we have a new phone and fax numbers for our Department  Please call with any questions or concerns to [phone] and carefully follow the prompts so that you are directed to the right person  All voicemails are confidential  Fax any determinations, approvals, denials, and requests for initial or continue stay review clinical to [phone]  Due to HIGH CALL volume, it would be easier if you could please send faxed requests to expedite your requests and in part, help us provide discharge notifications faster

## 2018-07-31 NOTE — PLAN OF CARE

## 2018-07-31 NOTE — ED PROVIDER NOTES
History  Chief Complaint   Patient presents with    Abdominal Pain     Pt c/o upper abd pain and nausea that started today  This 60-year-old female with history of idiopathic pancreatitis and no prior surgeries complains of right upper quadrant pain  She states this started an hour ago and was coming in waves  It was more severe earlier but is much better now  She had slight nausea but no vomiting  Patient states this feels like prior episode of pancreatitis although she also states that she was told she should have her gallbladder taken out  She says gallbladder ultrasound and CT never showed any gallbladder stones  Patient denies any recent change in diet, activity or medications  Patient denies fever, diarrhea, constipation, bloody stool, dysuria or foreign travel  Prior to Admission Medications   Prescriptions Last Dose Informant Patient Reported? Taking? cholecalciferol (VITAMIN D3) 1,000 units tablet   Yes Yes   Sig: Take 1,000 Units by mouth once a week     drospirenone-ethinyl estradiol (CAROLYN) 3-0 02 MG per tablet  Self Yes Yes   Sig: Take 1 tablet by mouth daily      Facility-Administered Medications: None       Past Medical History:   Diagnosis Date    Pancreatitis 2015       Past Surgical History:   Procedure Laterality Date    TONSILLECTOMY         Family History   Problem Relation Age of Onset    Hypertension Father      I have reviewed and agree with the history as documented  Social History   Substance Use Topics    Smoking status: Never Smoker    Smokeless tobacco: Never Used    Alcohol use Yes      Comment: rarely        Review of Systems   Constitutional: Negative  HENT: Negative  Eyes: Negative  Respiratory: Negative  Cardiovascular: Negative  Gastrointestinal: Negative  Endocrine: Negative  Genitourinary: Negative  Musculoskeletal: Negative  Skin: Negative  Allergic/Immunologic: Negative  Neurological: Negative  Hematological: Negative  Psychiatric/Behavioral: Negative  All other systems reviewed and are negative  Physical Exam  Physical Exam   Constitutional: She is oriented to person, place, and time  She appears well-developed and well-nourished  HENT:   Head: Normocephalic and atraumatic  Eyes: Conjunctivae and EOM are normal  Pupils are equal, round, and reactive to light  Neck: Normal range of motion  Neck supple  No JVD present  Cardiovascular: Normal rate, regular rhythm and intact distal pulses  No murmur heard  Pulmonary/Chest: Effort normal and breath sounds normal    Abdominal: Soft  Bowel sounds are normal  She exhibits no distension and no mass  There is no tenderness  There is no rebound and no guarding  No hernia  Musculoskeletal: Normal range of motion  She exhibits no edema  Neurological: She is alert and oriented to person, place, and time  She has normal reflexes  No cranial nerve deficit  Coordination normal    Skin: Skin is warm and dry  Capillary refill takes less than 2 seconds  No rash noted  Psychiatric: She has a normal mood and affect  Nursing note and vitals reviewed        Vital Signs  ED Triage Vitals [07/30/18 2100]   Temperature Pulse Respirations Blood Pressure SpO2   98 5 °F (36 9 °C) 77 18 134/86 96 %      Temp Source Heart Rate Source Patient Position - Orthostatic VS BP Location FiO2 (%)   Temporal Monitor Lying Right arm --      Pain Score       9           Vitals:    07/30/18 2100   BP: 134/86   Pulse: 77   Patient Position - Orthostatic VS: Lying       Visual Acuity      ED Medications  Medications - No data to display    Diagnostic Studies  Results Reviewed     Procedure Component Value Units Date/Time    Lipase [[id number]]  (Abnormal) Collected:  07/30/18 2134    Lab Status:  Final result Specimen:  Blood from Arm, Right Updated:  07/30/18 2225     Lipase 20,176 (H) u/L     Basic metabolic panel [[id number]] Collected:  07/30/18 2134    Lab Status: Final result Specimen:  Blood from Arm, Right Updated:  07/30/18 2209     Sodium 137 mmol/L      Potassium 3 9 mmol/L      Chloride 101 mmol/L      CO2 28 mmol/L      Anion Gap 8 mmol/L      BUN 11 mg/dL      Creatinine 0 83 mg/dL      Glucose 111 mg/dL      Calcium 9 6 mg/dL      eGFR 97 ml/min/1 73sq m     Narrative:         National Kidney Disease Education Program recommendations are as follows:  GFR calculation is accurate only with a steady state creatinine  Chronic Kidney disease less than 60 ml/min/1 73 sq  meters  Kidney failure less than 15 ml/min/1 73 sq  meters      Hepatic function panel [[id number]]  (Abnormal) Collected:  07/30/18 2134    Lab Status:  Final result Specimen:  Blood from Arm, Right Updated:  07/30/18 2209     Total Bilirubin 0 40 mg/dL      Bilirubin, Direct 0 15 mg/dL      Alkaline Phosphatase 71 U/L      AST 46 (H) U/L      ALT 32 U/L      Total Protein 7 9 g/dL      Albumin 3 7 g/dL     CBC and differential [[id number]]  (Abnormal) Collected:  07/30/18 2134    Lab Status:  Final result Specimen:  Blood from Arm, Right Updated:  07/30/18 2148     WBC 16 71 (H) Thousand/uL      RBC 4 26 Million/uL      Hemoglobin 13 4 g/dL      Hematocrit 40 5 %      MCV 95 fL      MCH 31 5 pg      MCHC 33 1 g/dL      RDW 12 9 %      MPV 11 2 fL      Platelets 877 Thousands/uL      nRBC 0 /100 WBCs      Neutrophils Relative 73 %      Immat GRANS % 0 %      Lymphocytes Relative 23 %      Monocytes Relative 3 (L) %      Eosinophils Relative 1 %      Basophils Relative 0 %      Neutrophils Absolute 12 01 (H) Thousands/µL      Immature Grans Absolute 0 05 Thousand/uL      Lymphocytes Absolute 3 81 Thousands/µL      Monocytes Absolute 0 54 Thousand/µL      Eosinophils Absolute 0 24 Thousand/µL      Basophils Absolute 0 06 Thousands/µL     POCT urinalysis dipstick [[id number]]  (Normal) Resulted:  07/30/18 2140    Lab Status:  Final result Specimen:  Urine Updated:  07/30/18 2141     Color, UA yellow     Clarity, UA clear     EXT Glucose, UA (Ref: Negative) neg     EXT Bilirubin, UA (Ref: Negative) neg     EXT Ketones, UA (Ref: Negative) neg     EXT Spec Grav, UA 1 010     EXT Blood, UA (Ref: Negative) moderate     EXT pH, UA 6 0     EXT Protein, UA (Ref: Negative) neg     EXT Urobilinogen, UA (Ref: 0 2- 1 0) 0 2     EXT Leukocytes, UA (Ref: Negative) neg     EXT Nitrite, UA (Ref: Negative) neg    POCT pregnancy, urine [[id number]]  (Normal) Resulted:  07/30/18 2140    Lab Status:  Final result Updated:  07/30/18 2140     EXT PREG TEST UR (Ref: Negative) neg                 No orders to display              Procedures  Procedures       Phone Contacts  ED Phone Contact    ED Course                               MDM  Number of Diagnoses or Management Options  Recurrent pancreatitis [facility]): new and requires workup  Diagnosis management comments: With elevated lipase patient will need bowel rest with IV hydration  She is about due for repeat GI consultation and ultrasound  Etiology of this pancreatitis is uncertain and patient requires admission       Amount and/or Complexity of Data Reviewed  Clinical lab tests: ordered and reviewed  Review and summarize past medical records: yes  Discuss the patient with other providers: yes      CritCare Time    Disposition  Final diagnoses:   Recurrent pancreatitis ([address] )     Time reflects when diagnosis was documented in both MDM as applicable and the Disposition within this note     Time User Action Codes Description Comment    7/30/2018 10:32 PM Dwayne Fritz Add [K86 1] Recurrent pancreatitis [facility])       ED Disposition     ED Disposition Condition Comment    Admit  Case was discussed with Dr Carleen Flores and the patient's admission status was agreed to be Admission Status: inpatient status to the service of Dr Aydee Kulkarni   Follow-up Information    None         Patient's Medications   Discharge Prescriptions    No medications on file     No discharge procedures on file      ED Provider  Electronically Signed by           Sav Garcia DO  07/30/18 6696

## 2018-07-31 NOTE — H&P
H&P Exam - Deonte Koroma 33 y o  female MRN: [id number]    Unit/Bed#: -01 Encounter: [id number]    Assessment:  Recurrent acute pancreatitis      Plan:  Admit for further evaluation of acute pancreatitis  Place on NPO status with generous IV hydration  Provide intravenous Zofran for nausea and vomiting and IV morphine for pain relief  Consult GI service for evaluation request ultrasound of the gallbladder for further evaluation  May require further evaluation with MRCP vs ERCP and would benefit from general surgical consult  Place on IV Protonix for for GI prophylaxis  Further management to be determined by GI and general surgical service  History of Present Illness   60-year-old  female presented to the emergency room with complaints of upper abdominal pain that started about 1 and 0 5 hours after she had a meal of scrambled eggs and tomato  Pain was of gradual onset with increasing intensity described as sharp waxing and waning graded 2 to 9/10  He denied any fever, dizziness diarrhea or vomiting but she did have some nausea  He came to the emergency room due to increase in intensity of pain  Bowel motion was at a today her stools described as hot and brownish  She reports similar episode of pain about 2 months ago when she was admitted at [facility] for about 2-3 days  Follow-up evaluation with a general surgeon is scheduled for this Friday in a few days  She reports 3 episodes of similar pain with the 1st episode in July 2015, 2nd episode about 8 weeks ago and this present episode  In the emergency room labs showed elevated lipase over 20,000 and elevated transaminase/AST of 46  I was asked to evaluate for admission  Review of Systems   Constitutional: Negative  HENT: Negative  Eyes: Negative  Respiratory: Negative  Cardiovascular: Negative  Gastrointestinal: Positive for abdominal pain  Endocrine: Negative  Genitourinary: Negative  Musculoskeletal: Negative  Skin: Negative  Allergic/Immunologic: Negative  Neurological: Negative  Hematological: Negative  Psychiatric/Behavioral: Negative  Historical Information   Past Medical History:   Diagnosis Date    Pancreatitis 2015     Past Surgical History:   Procedure Laterality Date    TONSILLECTOMY       Social History   History   Alcohol Use    Yes     Comment: rarely     History   Drug Use No     History   Smoking Status    Never Smoker   Smokeless Tobacco    Never Used     Family History: non-contributory    Meds/Allergies   all medications and allergies reviewed  No Known Allergies    Objective   First Vitals:   Blood Pressure: 134/86 (07/30/18 2100)  Pulse: 77 (07/30/18 2100)  Temperature: 98 5 °F (36 9 °C) (07/30/18 2100)  Temp Source: Temporal (07/30/18 2100)  Respirations: 18 (07/30/18 2100)  Height: 5' 3" (160 cm) (07/30/18 2100)  Weight - Scale: 65 8 kg (145 lb) (07/30/18 2100)  SpO2: 96 % (07/30/18 2100)    Current Vitals:   Blood Pressure: 142/80 (07/30/18 2318)  Pulse: 83 (07/30/18 2318)  Temperature: 98 2 °F (36 8 °C) (07/30/18 2318)  Temp Source: Oral (07/30/18 2318)  Respirations: 18 (07/30/18 2318)  Height: 5' 3" (160 cm) (07/30/18 2318)  Weight - Scale: 66 kg (145 lb 8 1 oz) (07/30/18 2318)  SpO2: 98 % (07/30/18 2318)    No intake or output data in the 24 hours ending 07/30/18 2332    Invasive Devices     Peripheral Intravenous Line            Peripheral IV 07/30/18 Right Antecubital less than 1 day                Physical Exam   Young  female not in any significant distress at this time  She is normocephalic atraumatic is afebrile, not pale, anicteric, not dehydrated  Neck is supple without distended veins or carotid bruit  Her lungs are clear to auscultation  Heart sounds 1 and 2 are heard and regular  Abdomen is scaphoid without any significant this at this time bowel sounds are normoactive  Musculoskeletal system/extremities    She has no pedal edema her distal pulses are 2+ bilaterally  CNS exam she is alert and oriented x3 without focal neurological deficit    Lab Results:     Results from last 7 days  Lab Units 07/30/18  2134   WBC Thousand/uL 16 71*   HEMOGLOBIN g/dL 13 4   HEMATOCRIT % 40 5   PLATELETS Thousands/uL 363   NEUTROS PCT % 73   LYMPHS PCT % 23   MONOS PCT % 3*   EOS PCT % 1       Results from last 7 days  Lab Units 07/30/18  2134   SODIUM mmol/L 137   POTASSIUM mmol/L 3 9   CHLORIDE mmol/L 101   CO2 mmol/L 28   BUN mg/dL 11   CREATININE mg/dL 0 83   CALCIUM mg/dL 9 6   TOTAL PROTEIN g/dL 7 9   BILIRUBIN TOTAL mg/dL 0 40   ALK PHOS U/L 71   ALT U/L 32   AST U/L 46*   GLUCOSE RANDOM mg/dL 111     No results found for: TROPONINI, CKMB, CKTOTAL        Urinalysis: Lab Results   Component Value Date    COLORU yellow 07/30/2018    CLARITYU clear 07/30/2018     No results found for: Nidia Hiss, SPUTUMCULTUR  Imaging:   EKG, Pathology,     Code Status: Prior  Advance Directive and Living Will:      Power of :    POLST:      Counseling / Coordination of Care:   Greater than 50% of total time was spent with the patient and / or family counseling and / or coordination of care  A description of the counseling / coordination of care: 39  I spent about 45 minutes in care coordination and evaluation of this patient  Needs further workup and evaluation by GI and general surgical service for recurrent acute idiopathic pancreatitis  Anticipate at least 2 midnight stay prior to discharge

## 2018-07-31 NOTE — SOCIAL WORK
Met with patient discussed role of Care Management  Patient resides with  finance in a 3rd floor apartment  She is independent of ADL's and uses no assistive device  She is employed  She plans to return home and anticipates no discharge needs

## 2018-08-01 NOTE — CASE MANAGEMENT
Auth:  [id number]    Notification of Discharge  This is a Notification of Discharge from our facility [address]  Please be advised that this patient has been discharge from our facility  Below you will find the admission and discharge date and time including the patients disposition  PRESENTATION DATE: 7/30/2018  8:58 PM  IP ADMISSION DATE: 7/30/18 2235  DISCHARGE DATE: 7/31/2018  2:14 PM  DISPOSITION: Home/Self Care    [address] [facility] in the [facility] by [facility] Utilization Review Department  Phone: [phone]; Fax [phone]  ATTENTION: The Network Utilization Review Department is now centralized for our 9 Facilities  Make a note that we have a new phone and fax numbers for our Department  Please call with any questions or concerns to [phone] and carefully follow the prompts so that you are directed to the right person  All voicemails are confidential  Fax any determinations, approvals, denials, and requests for initial or continue stay review clinical to [phone]  Due to HIGH CALL volume, it would be easier if you could please send faxed requests to expedite your requests and in part, help us provide discharge notifications faster

## 2018-08-09 ENCOUNTER — APPOINTMENT (INPATIENT)
Dept: MRI IMAGING | Facility: HOSPITAL | Age: 28
DRG: 440 | End: 2018-08-09
Payer: COMMERCIAL

## 2018-08-09 ENCOUNTER — HOSPITAL ENCOUNTER (INPATIENT)
Facility: HOSPITAL | Age: 28
LOS: 1 days | Discharge: HOME | DRG: 440 | End: 2018-08-10
Attending: EMERGENCY MEDICINE | Admitting: HOSPITALIST
Payer: COMMERCIAL

## 2018-08-09 DIAGNOSIS — K85.90 PANCREATITIS: Primary | ICD-10-CM

## 2018-08-09 DIAGNOSIS — E87.6 HYPOKALEMIA: ICD-10-CM

## 2018-08-09 DIAGNOSIS — K85.90 RECURRENT ACUTE PANCREATITIS: ICD-10-CM

## 2018-08-09 PROBLEM — R73.9 HYPERGLYCEMIA: Status: ACTIVE | Noted: 2018-08-09

## 2018-08-09 LAB
ALBUMIN SERPL BCP-MCNC: 3.6 G/DL (ref 3.5–5)
ALP SERPL-CCNC: 95 U/L (ref 46–116)
ALT SERPL W P-5'-P-CCNC: 30 U/L (ref 12–78)
ANION GAP SERPL CALCULATED.3IONS-SCNC: 13 MMOL/L (ref 4–13)
APTT PPP: 28 SECONDS (ref 24–36)
AST SERPL W P-5'-P-CCNC: 47 U/L (ref 5–45)
BASOPHILS # BLD AUTO: 0.07 THOUSANDS/ΜL (ref 0–0.1)
BASOPHILS NFR BLD AUTO: 1 % (ref 0–1)
BILIRUB SERPL-MCNC: 0.4 MG/DL (ref 0.2–1)
BUN SERPL-MCNC: 9 MG/DL (ref 5–25)
CALCIUM SERPL-MCNC: 9.6 MG/DL (ref 8.3–10.1)
CHLORIDE SERPL-SCNC: 102 MMOL/L (ref 100–108)
CLARITY, POC: CLEAR
CO2 SERPL-SCNC: 24 MMOL/L (ref 21–32)
COLOR, POC: YELLOW
CREAT SERPL-MCNC: 0.76 MG/DL (ref 0.6–1.3)
EOSINOPHIL # BLD AUTO: 0.36 THOUSAND/ΜL (ref 0–0.61)
EOSINOPHIL NFR BLD AUTO: 3 % (ref 0–6)
ERYTHROCYTE [DISTWIDTH] IN BLOOD BY AUTOMATED COUNT: 12.8 % (ref 11.6–15.1)
ETHANOL SERPL-MCNC: <3 MG/DL (ref 0–3)
EXT BILIRUBIN, UA: ABNORMAL
EXT BLOOD URINE: ABNORMAL
EXT GLUCOSE, UA: ABNORMAL
EXT KETONES: ABNORMAL
EXT NITRITE, UA: ABNORMAL
EXT PH, UA: 6.5
EXT PREG TEST URINE: NEGATIVE
EXT PROTEIN, UA: ABNORMAL
EXT SPECIFIC GRAVITY, UA: 1.01
EXT UROBILINOGEN: 0.2
GFR SERPL CREATININE-BSD FRML MDRD: 108 ML/MIN/1.73SQ M
GLUCOSE SERPL-MCNC: 102 MG/DL (ref 65–140)
HCT VFR BLD AUTO: 41.1 % (ref 34.8–46.1)
HGB BLD-MCNC: 13.8 G/DL (ref 11.5–15.4)
IMM GRANULOCYTES # BLD AUTO: 0.04 THOUSAND/UL (ref 0–0.2)
IMM GRANULOCYTES NFR BLD AUTO: 0 % (ref 0–2)
INR PPP: 1.03 (ref 0.86–1.17)
LIPASE SERPL-CCNC: 5131 U/L (ref 73–393)
LYMPHOCYTES # BLD AUTO: 6.18 THOUSANDS/ΜL (ref 0.6–4.47)
LYMPHOCYTES NFR BLD AUTO: 49 % (ref 14–44)
MCH RBC QN AUTO: 31.4 PG (ref 26.8–34.3)
MCHC RBC AUTO-ENTMCNC: 33.6 G/DL (ref 31.4–37.4)
MCV RBC AUTO: 94 FL (ref 82–98)
MONOCYTES # BLD AUTO: 0.68 THOUSAND/ΜL (ref 0.17–1.22)
MONOCYTES NFR BLD AUTO: 5 % (ref 4–12)
NEUTROPHILS # BLD AUTO: 5.26 THOUSANDS/ΜL (ref 1.85–7.62)
NEUTS SEG NFR BLD AUTO: 42 % (ref 43–75)
NRBC BLD AUTO-RTO: 0 /100 WBCS
PLATELET # BLD AUTO: 401 THOUSANDS/UL (ref 149–390)
PMV BLD AUTO: 11.9 FL (ref 8.9–12.7)
POTASSIUM SERPL-SCNC: 3.1 MMOL/L (ref 3.5–5.3)
PROT SERPL-MCNC: 7.9 G/DL (ref 6.4–8.2)
PROTHROMBIN TIME: 12.9 SECONDS (ref 11.8–14.2)
RBC # BLD AUTO: 4.39 MILLION/UL (ref 3.81–5.12)
SODIUM SERPL-SCNC: 139 MMOL/L (ref 136–145)
WBC # BLD AUTO: 12.59 THOUSAND/UL (ref 4.31–10.16)
WBC # BLD EST: ABNORMAL 10*3/UL

## 2018-08-09 PROCEDURE — 85610 PROTHROMBIN TIME: CPT | Performed by: EMERGENCY MEDICINE

## 2018-08-09 PROCEDURE — 96375 TX/PRO/DX INJ NEW DRUG ADDON: CPT

## 2018-08-09 PROCEDURE — 81025 URINE PREGNANCY TEST: CPT | Performed by: EMERGENCY MEDICINE

## 2018-08-09 PROCEDURE — 80053 COMPREHEN METABOLIC PANEL: CPT | Performed by: EMERGENCY MEDICINE

## 2018-08-09 PROCEDURE — 96374 THER/PROPH/DIAG INJ IV PUSH: CPT

## 2018-08-09 PROCEDURE — 99223 1ST HOSP IP/OBS HIGH 75: CPT | Performed by: HOSPITALIST

## 2018-08-09 PROCEDURE — 99285 EMERGENCY DEPT VISIT HI MDM: CPT

## 2018-08-09 PROCEDURE — 85025 COMPLETE CBC W/AUTO DIFF WBC: CPT | Performed by: EMERGENCY MEDICINE

## 2018-08-09 PROCEDURE — 74181 MRI ABDOMEN W/O CONTRAST: CPT

## 2018-08-09 PROCEDURE — 80320 DRUG SCREEN QUANTALCOHOLS: CPT | Performed by: EMERGENCY MEDICINE

## 2018-08-09 PROCEDURE — 96361 HYDRATE IV INFUSION ADD-ON: CPT

## 2018-08-09 PROCEDURE — 85730 THROMBOPLASTIN TIME PARTIAL: CPT | Performed by: EMERGENCY MEDICINE

## 2018-08-09 PROCEDURE — 81002 URINALYSIS NONAUTO W/O SCOPE: CPT | Performed by: EMERGENCY MEDICINE

## 2018-08-09 PROCEDURE — 83690 ASSAY OF LIPASE: CPT | Performed by: EMERGENCY MEDICINE

## 2018-08-09 PROCEDURE — 36415 COLL VENOUS BLD VENIPUNCTURE: CPT | Performed by: EMERGENCY MEDICINE

## 2018-08-09 RX ORDER — FENTANYL CITRATE 50 UG/ML
25 INJECTION, SOLUTION INTRAMUSCULAR; INTRAVENOUS ONCE
Status: COMPLETED | OUTPATIENT
Start: 2018-08-09 | End: 2018-08-09

## 2018-08-09 RX ORDER — ONDANSETRON 2 MG/ML
4 INJECTION INTRAMUSCULAR; INTRAVENOUS ONCE
Status: COMPLETED | OUTPATIENT
Start: 2018-08-09 | End: 2018-08-09

## 2018-08-09 RX ORDER — FENTANYL CITRATE 50 UG/ML
25 INJECTION, SOLUTION INTRAMUSCULAR; INTRAVENOUS EVERY 2 HOUR PRN
Status: DISCONTINUED | OUTPATIENT
Start: 2018-08-09 | End: 2018-08-10 | Stop reason: HOSPADM

## 2018-08-09 RX ORDER — ONDANSETRON 2 MG/ML
4 INJECTION INTRAMUSCULAR; INTRAVENOUS EVERY 6 HOURS PRN
Status: DISCONTINUED | OUTPATIENT
Start: 2018-08-09 | End: 2018-08-10 | Stop reason: HOSPADM

## 2018-08-09 RX ORDER — POTASSIUM CHLORIDE 14.9 MG/ML
20 INJECTION INTRAVENOUS ONCE
Status: COMPLETED | OUTPATIENT
Start: 2018-08-09 | End: 2018-08-09

## 2018-08-09 RX ORDER — MELATONIN
1000 WEEKLY
Status: DISCONTINUED | OUTPATIENT
Start: 2018-08-09 | End: 2018-08-10 | Stop reason: HOSPADM

## 2018-08-09 RX ORDER — SODIUM CHLORIDE 9 MG/ML
100 INJECTION, SOLUTION INTRAVENOUS CONTINUOUS
Status: DISCONTINUED | OUTPATIENT
Start: 2018-08-09 | End: 2018-08-10 | Stop reason: HOSPADM

## 2018-08-09 RX ORDER — DROSPIRENONE AND ETHINYL ESTRADIOL 0.02-3(28)
1 KIT ORAL DAILY
Status: DISCONTINUED | OUTPATIENT
Start: 2018-08-09 | End: 2018-08-10 | Stop reason: HOSPADM

## 2018-08-09 RX ADMIN — SODIUM CHLORIDE 100 ML/HR: 0.9 INJECTION, SOLUTION INTRAVENOUS at 06:00

## 2018-08-09 RX ADMIN — ONDANSETRON 4 MG: 2 INJECTION, SOLUTION INTRAMUSCULAR; INTRAVENOUS at 03:00

## 2018-08-09 RX ADMIN — POTASSIUM CHLORIDE 20 MEQ: 200 INJECTION, SOLUTION INTRAVENOUS at 04:20

## 2018-08-09 RX ADMIN — POTASSIUM CHLORIDE 20 MEQ: 200 INJECTION, SOLUTION INTRAVENOUS at 07:12

## 2018-08-09 RX ADMIN — SODIUM CHLORIDE 100 ML/HR: 0.9 INJECTION, SOLUTION INTRAVENOUS at 17:19

## 2018-08-09 RX ADMIN — FENTANYL CITRATE 25 MCG: 50 INJECTION, SOLUTION INTRAMUSCULAR; INTRAVENOUS at 03:40

## 2018-08-09 RX ADMIN — FENTANYL CITRATE 25 MCG: 50 INJECTION, SOLUTION INTRAMUSCULAR; INTRAVENOUS at 04:24

## 2018-08-09 RX ADMIN — SODIUM CHLORIDE 1000 ML: 0.9 INJECTION, SOLUTION INTRAVENOUS at 03:02

## 2018-08-09 RX ADMIN — ENOXAPARIN SODIUM 40 MG: 40 INJECTION, SOLUTION INTRAVENOUS; SUBCUTANEOUS at 08:20

## 2018-08-09 NOTE — ED PROVIDER NOTES
History  Chief Complaint   Patient presents with    Abdominal Pain     pt presents to ER stating an hour ago she was woken up by mid abdominal pain  pt states she has had pancreatitis in the past and that it feels exactly like it did before  This is a 26-year-old female who presents with epigastric pain nausea vomiting over the past hour she has a history of recurrent pancreatitis of unknown etiology she has been admitted here in the recent past she had a CT scan which showed pancreatitis without any abscess or necrotic or hemorrhagic involvement she also had ultrasound which showed some mild sludge she had a lipase of over 25,000 she was admitted for IV fluid and she was supposed to have outpatient MRCP which was not done to this  Point  Her  Triglycerides were only slightly elevated she denies any alcohol use        History provided by:  Patient  Abdominal Pain   Pain location:  Epigastric  Pain quality: aching    Pain radiates to:  Does not radiate  Pain severity:  Moderate  Onset quality:  Sudden  Duration:  1 hour  Timing:  Constant  Progression:  Unchanged  Chronicity:  Recurrent  Context: not alcohol use    Relieved by:  Nothing  Worsened by:  Palpation  Associated symptoms: nausea and vomiting    Associated symptoms: no chest pain and no melena    Risk factors: no alcohol abuse        Prior to Admission Medications   Prescriptions Last Dose Informant Patient Reported? Taking?    cholecalciferol (VITAMIN D3) 1,000 units tablet   Yes No   Sig: Take 1,000 Units by mouth once a week     drospirenone-ethinyl estradiol (CAROLYN) 3-0 02 MG per tablet  Self Yes No   Sig: Take 1 tablet by mouth daily      Facility-Administered Medications: None       Past Medical History:   Diagnosis Date    Pancreatitis 2015       Past Surgical History:   Procedure Laterality Date    TONSILLECTOMY         Family History   Problem Relation Age of Onset    Hypertension Father      I have reviewed and agree with the history as documented  Social History   Substance Use Topics    Smoking status: Never Smoker    Smokeless tobacco: Never Used    Alcohol use Yes      Comment: rarely        Review of Systems   Cardiovascular: Negative for chest pain  Gastrointestinal: Positive for abdominal pain, nausea and vomiting  Negative for melena  All other systems reviewed and are negative  Physical Exam  Physical Exam   Constitutional: She is oriented to person, place, and time  She appears well-developed and well-nourished  No distress  HENT:   Head: Normocephalic and atraumatic  Nose: Nose normal    Mouth/Throat: Oropharynx is clear and moist    Eyes: EOM are normal  Pupils are equal, round, and reactive to light  No scleral icterus  Neck: Neck supple  No tracheal deviation present  Cardiovascular: Normal rate, regular rhythm and intact distal pulses  Exam reveals no gallop and no friction rub  No murmur heard  Pulmonary/Chest: Effort normal and breath sounds normal  No stridor  No respiratory distress  She has no wheezes  She has no rales  Abdominal: Soft  Bowel sounds are normal  She exhibits no distension  There is tenderness (igastric)  There is no rebound and no guarding  Musculoskeletal: Normal range of motion  She exhibits no edema or deformity  Neurological: She is alert and oriented to person, place, and time  No cranial nerve deficit  Skin: Skin is warm and dry  No rash noted  She is not diaphoretic  Psychiatric: She has a normal mood and affect  Her behavior is normal  Thought content normal    Nursing note and vitals reviewed        Vital Signs  ED Triage Vitals   Temperature Pulse Respirations Blood Pressure SpO2   08/09/18 0234 08/09/18 0233 08/09/18 0233 08/09/18 0233 08/09/18 0233   97 8 °F (36 6 °C) 76 18 149/97 99 %      Temp Source Heart Rate Source Patient Position - Orthostatic VS BP Location FiO2 (%)   08/09/18 0234 08/09/18 0233 08/09/18 0233 08/09/18 0233 --   Oral Monitor Lying Right arm Pain Score       08/09/18 0233       Worst Possible Pain           Vitals:    08/09/18 0233   BP: 149/97   Pulse: 76   Patient Position - Orthostatic VS: Lying       Visual Acuity      ED Medications  Medications   potassium chloride 20 mEq IVPB (premix) (not administered)   fentanyl citrate (PF) 100 MCG/2ML 25 mcg (not administered)   sodium chloride 0 9 % bolus 1,000 mL (0 mL Intravenous Stopped 8/9/18 0344)   ondansetron (ZOFRAN) injection 4 mg (4 mg Intravenous Given 8/9/18 0300)   fentanyl citrate (PF) 100 MCG/2ML 25 mcg (25 mcg Intravenous Given 8/9/18 0340)       Diagnostic Studies  Results Reviewed     Procedure Component Value Units Date/Time    Comprehensive metabolic panel [[id number]]  (Abnormal) Collected:  08/09/18 0322    Lab Status:  Final result Specimen:  Blood from Arm, Left Updated:  08/09/18 0354     Sodium 139 mmol/L      Potassium 3 1 (L) mmol/L      Chloride 102 mmol/L      CO2 24 mmol/L      Anion Gap 13 mmol/L      BUN 9 mg/dL      Creatinine 0 76 mg/dL      Glucose 102 mg/dL      Calcium 9 6 mg/dL      AST 47 (H) U/L      ALT 30 U/L      Alkaline Phosphatase 95 U/L      Total Protein 7 9 g/dL      Albumin 3 6 g/dL      Total Bilirubin 0 40 mg/dL      eGFR 108 ml/min/1 73sq m     Narrative:         National Kidney Disease Education Program recommendations are as follows:  GFR calculation is accurate only with a steady state creatinine  Chronic Kidney disease less than 60 ml/min/1 73 sq  meters  Kidney failure less than 15 ml/min/1 73 sq  meters      Lipase [[id number]]  (Abnormal) Collected:  08/09/18 0322    Lab Status:  Final result Specimen:  Blood from Arm, Left Updated:  08/09/18 0354     Lipase 5,131 (H) u/L     Ethanol [[id number]]  (Normal) Collected:  08/09/18 0322    Lab Status:  Final result Specimen:  Blood from Arm, Left Updated:  08/09/18 0354     Ethanol Lvl <3 mg/dL     APTT [[id number]]  (Normal) Collected:  08/09/18 0322    Lab Status:  Final result Specimen:  Blood from Arm, Left Updated:  08/09/18 0345     PTT 28 seconds     Protime-INR [[id number]]  (Normal) Collected:  08/09/18 0322    Lab Status:  Final result Specimen:  Blood from Arm, Left Updated:  08/09/18 0345     Protime 12 9 seconds      INR 1 03    CBC and differential [[id number]]  (Abnormal) Collected:  08/09/18 0322    Lab Status:  Final result Specimen:  Blood from Arm, Left Updated:  08/09/18 0330     WBC 12 59 (H) Thousand/uL      RBC 4 39 Million/uL      Hemoglobin 13 8 g/dL      Hematocrit 41 1 %      MCV 94 fL      MCH 31 4 pg      MCHC 33 6 g/dL      RDW 12 8 %      MPV 11 9 fL      Platelets 636 (H) Thousands/uL      nRBC 0 /100 WBCs      Neutrophils Relative 42 (L) %      Immat GRANS % 0 %      Lymphocytes Relative 49 (H) %      Monocytes Relative 5 %      Eosinophils Relative 3 %      Basophils Relative 1 %      Neutrophils Absolute 5 26 Thousands/µL      Immature Grans Absolute 0 04 Thousand/uL      Lymphocytes Absolute 6 18 (H) Thousands/µL      Monocytes Absolute 0 68 Thousand/µL      Eosinophils Absolute 0 36 Thousand/µL      Basophils Absolute 0 07 Thousands/µL     POCT urinalysis dipstick [[id number]]     Lab Status:  No result Specimen:  Urine     POCT pregnancy, urine [[id number]]     Lab Status:  No result                  No orders to display              Procedures  Procedures       Phone Contacts  ED Phone Contact    ED Course  ED Course as of Aug 09 0413   Thu Aug 09, 2018   0402  Patient was started on CAROLYN in March, may cause pancreatitis                                MDM  Number of Diagnoses or Management Options  Diagnosis management comments:  Abdominal pain rule out recurrent pancreatitis versus peptic ulcer disease or gastritis       Amount and/or Complexity of Data Reviewed  Clinical lab tests: ordered  Tests in the radiology section of CPT®: ordered      CritCare Time    Disposition  Final diagnoses:   Pancreatitis   Hypokalemia     Time reflects when diagnosis was documented in both MDM as applicable and the Disposition within this note     Time User Action Codes Description Comment    8/9/2018  4:11 AM Alf Krishna [K85 90] Pancreatitis     8/9/2018  4:11 AM Alf Krishna [E87 6] Hypokalemia       ED Disposition     ED Disposition Condition Comment    Admit  Case was discussed with *PA covering Dr Josse Crowe** and the patient's admission status was agreed to be Admission Status: inpatient status to the service of Dr Josse Crowe   Follow-up Information    None         Patient's Medications   Discharge Prescriptions    No medications on file     No discharge procedures on file      ED Provider  Electronically Signed by           Jeronimo Santiago DO  08/09/18 4675

## 2018-08-09 NOTE — ED NOTES
Pt does not have to urinate at this time, aware urine sample is needed        Jose Luis Cannon RN  08/09/18 3947

## 2018-08-09 NOTE — CASE MANAGEMENT
Initial Clinical Review    Admission: Date/Time/Statement: 8/9/18 @ 0413     Orders Placed This Encounter   Procedures    Inpatient Admission (expected length of stay for this patient is greater than two midnights)     Standing Status:   Standing     Number of Occurrences:   1     Order Specific Question:   Admitting Physician     Answer:   Keshawn Jackson [[id number]]     Order Specific Question:   Level of Care     Answer:   Med Surg [[id number]]     Order Specific Question:   Estimated length of stay     Answer:   More than 2 Midnights     Order Specific Question:   Certification     Answer:   I certify that inpatient services are medically necessary for this patient for a duration of greater than two midnights  See H&P and MD Progress Notes for additional information about the patient's course of treatment  ED: Date/Time/Mode of Arrival:   ED Arrival Information     Expected Arrival Acuity Means of Arrival Escorted By Service Admission Type    - 8/9/2018 02:23 Urgent Walk-In Other General Medicine Urgent    Arrival Complaint    PANCREATIC PAIN          Chief Complaint:   Chief Complaint   Patient presents with    Abdominal Pain     pt presents to ER stating an hour ago she was woken up by mid abdominal pain  pt states she has had pancreatitis in the past and that it feels exactly like it did before  History of Illness:  33 y o  female with a PMH for the opacity acute pancreatitis without infection or necrosis who presents with abdominal pain x1 day  Patient states that epigastric pain woke her up from sleep this morning  She states that it felt similar to her last episode of pancreatitis  Patient also states that she had a similar episode earlier this week however resolved on its own and she did not come to the ED  This time, however the pain did not go away  She currently complains of nausea, vomiting      She has been admitted to [address] [facility] in the past where a CT scan on 06/03/2018 showed pancreatitis with no mass, necrosis, dilatation, or calcified gallstone  An ultrasound on 07/31/2018 showed minimal gallbladder sludge  Triglycerides on 07/31/2018 were 204  The patient was admitted on 07/30/2018 her symptoms improved rapidly and she was scheduled for an outpatient MRCP with a follow-up with a pancreatitis specialist   Patient has not undergone MRCP at this time, she states she was waiting for insurance to clear the procedure  While in the ED her lipase was 5138  Of note the patient started the birth control pill Trupti in March  She denies alcohol use  ED Vital Signs:   ED Triage Vitals   Temperature Pulse Respirations Blood Pressure SpO2   08/09/18 0234 08/09/18 0233 08/09/18 0233 08/09/18 0233 08/09/18 0233   97 8 °F (36 6 °C) 76 18 149/97 99 %      Temp Source Heart Rate Source Patient Position - Orthostatic VS BP Location FiO2 (%)   08/09/18 0234 08/09/18 0233 08/09/18 0233 08/09/18 0233 --   Oral Monitor Lying Right arm       Pain Score       08/09/18 0233       Worst Possible Pain        Wt Readings from Last 1 Encounters:   08/09/18 64 6 kg (142 lb 6 7 oz)       Vital Signs (abnormal): none  Exam - epigastric tenderness  Abnormal Labs/Diagnostic Test Results:   K 3 1   ast 47  Lipase 5131  Wbc 12 59    MRCP- Prominent pancreatic tail with adjacent peripancreatic fluid, consistent with distal pancreatitis  2   Normal appearance of the biliary tree without evidence of choledocholithiasis  8/10/2018  Wbc 10 30  Bun 3  Lipase 426       ED Treatment:   Medication Administration from 08/09/2018 0223 to 08/09/2018 0530       Date/Time Order Dose Route Action Comments     08/09/2018 0344 sodium chloride 0 9 % bolus 1,000 mL 0 mL Intravenous Stopped      08/09/2018 0302 sodium chloride 0 9 % bolus 1,000 mL 1,000 mL Intravenous New Bag      08/09/2018 0300 ondansetron (ZOFRAN) injection 4 mg 4 mg Intravenous Given      08/09/2018 0340 fentanyl citrate (PF) 100 MCG/2ML 25 mcg 25 mcg Intravenous Given 08/09/2018 0420 potassium chloride 20 mEq IVPB (premix) 20 mEq Intravenous New Bag      08/09/2018 0424 fentanyl citrate (PF) 100 MCG/2ML 25 mcg 25 mcg Intravenous Given           Past Medical/Surgical History: Active Ambulatory Problems     Diagnosis Date Noted    Idiopathic acute pancreatitis without infection or necrosis 06/02/2018    Hypokalemia 06/02/2018    Recurrent acute pancreatitis 07/30/2018     Resolved Ambulatory Problems     Diagnosis Date Noted    No Resolved Ambulatory Problems     Past Medical History:   Diagnosis Date    Pancreatitis 2015       Admitting Diagnosis: Recurrent acute pancreatitis [K85 90]  Hypokalemia [E87 6]  Pancreatitis [K85 90]  Abdominal pain [R10 9]    Age/Sex: 33 y o  female    Assessment/Plan:   Recurrent acute pancreatitis   Assessment & Plan     · Recently D/Cd on 7/31/18   · NPO  ·  mL/hr  · Fentanyl 25 mcg q 2h p r n  pain  · GI consult for possible MRCP  · Lipase, CBC, BMP in a m           Hyperglycemia   Assessment & Plan     ·  on admission  · Likely secondary to pancreatitis  · A1c in a m  Admission Orders:  8/9/2018  0413 INPATIENT   Scheduled Meds:   Current Facility-Administered Medications:  cholecalciferol 1,000 Units Oral Weekly    drospirenone-ethinyl estradiol 1 tablet Oral Daily    enoxaparin 40 mg Subcutaneous Daily    fentanyl citrate (PF) 25 mcg Intravenous Q2H PRN    ondansetron 4 mg Intravenous Q6H PRN    potassium chloride 20 mEq Intravenous Once Last Rate: 20 mEq (08/09/18 0712)   sodium chloride 100 mL/hr Intravenous Continuous Last Rate: 100 mL/hr (08/09/18 0600)     Continuous Infusions:   sodium chloride 100 mL/hr Last Rate: 100 mL/hr (08/09/18 0600)     PRN Meds:not used  OTHER ORDERS:  Up as tolerated  NPO  Consult GI    Per GI- Acute pancreatitis, recurrent   Idiopathic   This constitutes her 4th episode over the past 3 years   This constitutes her 3rd hospitalization over the past 2 months for the same without any clear etiology    LFTs minimally elevated so will exclude choledocholithiasis  Possibly a passed common bile duct stone or small pancreatic cyst   Exclude peptic ulcer disease  Denies alcohol or smoking   Abdominal ultrasound negative for stones   CT scan of the abdomen pelvis on last admission which showed acute uncomplicated pancreatitis    Autoimmune pancreatitis has been excluded via serology, as well as hypercalcemia   Triglycerides are slightly elevated but not to the extent that would cause pancreatitis   No viral prodrome   No offending medication       PLAN  1  Aggressive IV fluids  2  IV analgesia/IV antiemetics  3  Bowel rest  4  MRCP today  If positive she will need an ERCP and if negative she will need an endoscopic ultrasound      Thank you,  Fish Aqq  [facility] Utilization Review Department  Phone: [phone]; Fax [phone]  ATTENTION: The Network Utilization Review Department is now centralized for our 9 Facilities  Make a note that we have a new phone and fax numbers for our Department  Please call with any questions or concerns to [phone] and carefully follow the prompts so that you are directed to the right person  All voicemails are confidential  Fax any determinations, approvals, denials, and requests for initial or continue stay review clinical to [phone]  Due to HIGH CALL volume, it would be easier if you could please send faxed requests to expedite your requests and in part, help us provide discharge notifications faster

## 2018-08-09 NOTE — ASSESSMENT & PLAN NOTE
· Recently D/Cd on 7/31/18 but did not undergo MRCP   · NPO  ·  mL/hr  · Fentanyl 25 mcg q 2h p r n  pain  · GI consult for possible MRCP  · Lipase, CBC, BMP in a m

## 2018-08-09 NOTE — H&P
[address] Internal Medicine    H&P- Mae Falcon 1990, 33 y o  female MRN: [id number]    Unit/Bed#: ED 06 Encounter: [id number]    Primary Care Provider: Lucero Natarajan,    Date and time admitted to hospital: 8/9/2018  2:30 AM        * Recurrent acute pancreatitis   Assessment & Plan    · Recently D/Cd on 7/31/18 but did not undergo MRCP   · NPO  ·  mL/hr  · Fentanyl 25 mcg q 2h p r n  pain  · GI consult for possible MRCP  · Lipase, CBC, BMP in a m  Hyperglycemia   Assessment & Plan    ·  on admission  · Likely secondary to pancreatitis  · A1c in a m  VTE Prophylaxis: Enoxaparin (Lovenox)  / sequential compression device and reason for no mechanical VTE prophylaxis None required   Code Status: Full  POLST: There is no POLST form on file for this patient (pre-hospital)  Discussion with family:  None    Anticipated Length of Stay:  Patient will be admitted on an Inpatient basis with an anticipated length of stay of  greater than 2 midnights  Justification for Hospital Stay:  Acute pancreatitis    Chief Complaint:   Pancreatitis    History of Present Illness:    Mae Falcon is a 33 y o  female with a PMH for the opacity acute pancreatitis without infection or necrosis who presents with abdominal pain x1 day  Patient states that epigastric pain woke her up from sleep this morning  She states that it felt similar to her last episode of pancreatitis  Patient also states that she had a similar episode earlier this week however resolved on its own and she did not come to the ED  This time, however the pain did not go away  She currently complains of nausea, vomiting  She has been admitted to [facility] in the past where a CT scan on 06/03/2018 showed pancreatitis with no mass, necrosis, dilatation, or calcified gallstone  An ultrasound on 07/31/2018 showed minimal gallbladder sludge  Triglycerides on 07/31/2018 were 204   The patient was admitted on 07/30/2018 her symptoms improved rapidly and she was scheduled for an outpatient MRCP with a follow-up with a pancreatitis specialist   Patient has not undergone MRCP at this time, she states she was waiting for insurance to clear the procedure  While in the ED her lipase was 5138  Of note the patient started the birth control pill Carolyn in March  She denies alcohol use, shortness of breath, chest pain, palpitations, trouble breathing, sick contacts, diarrhea  Review of Systems:    Review of Systems   Constitutional: Negative for appetite change, chills, fatigue and fever  Eyes: Negative for photophobia  Respiratory: Negative for cough, chest tightness, shortness of breath and wheezing  Cardiovascular: Negative for chest pain, palpitations and leg swelling  Gastrointestinal: Positive for abdominal pain, nausea and vomiting  Negative for abdominal distention, blood in stool, constipation, diarrhea and rectal pain  Endocrine: Negative for polydipsia, polyphagia and polyuria  Musculoskeletal: Negative for back pain, neck pain and neck stiffness  Skin: Negative for pallor, rash and wound  Neurological: Negative for dizziness, tremors, syncope, weakness, light-headedness, numbness and headaches  All other systems reviewed and are negative  Past Medical and Surgical History:     Past Medical History:   Diagnosis Date    Pancreatitis 2015       Past Surgical History:   Procedure Laterality Date    TONSILLECTOMY         Meds/Allergies:    Prior to Admission medications    Medication Sig Start Date End Date Taking? Authorizing Provider   cholecalciferol (VITAMIN D3) 1,000 units tablet Take 1,000 Units by mouth once a week      Historical Provider, MD   drospirenone-ethinyl estradiol (CAROLYN) 3-0 02 MG per tablet Take 1 tablet by mouth daily    Historical Provider, MD     I have reviewed home medications with patient personally      Allergies: No Known Allergies    Social History:     Marital Status: Single   Occupation: Noncontributory  Patient Pre-hospital Living Situation:  Family  Patient Pre-hospital Level of Mobility:  Full  Patient Pre-hospital Diet Restrictions:  None  Substance Use History:   History   Alcohol Use    Yes     Comment: rarely     History   Smoking Status    Never Smoker   Smokeless Tobacco    Never Used     History   Drug Use No       Family History:    Family History   Problem Relation Age of Onset    Hypertension Father        Physical Exam:     Vitals:   Blood Pressure: 120/78 (08/09/18 0445)  Pulse: 80 (08/09/18 0445)  Temperature: 97 8 °F (36 6 °C) (08/09/18 0234)  Temp Source: Oral (08/09/18 0234)  Respirations: 18 (08/09/18 0233)  Height: 5' 3" (160 cm) (08/09/18 0233)  Weight - Scale: 65 8 kg (145 lb) (08/09/18 0233)  SpO2: 100 % (08/09/18 0445)    Physical Exam   Constitutional: She is oriented to person, place, and time  Vital signs are normal  She appears well-developed and well-nourished  Non-toxic appearance  No distress  HENT:   Head: Normocephalic and atraumatic  Mouth/Throat: Oropharynx is clear and moist and mucous membranes are normal  Normal dentition  No oropharyngeal exudate  Eyes: Conjunctivae are normal  Pupils are equal, round, and reactive to light  Right eye exhibits no discharge  Left eye exhibits no discharge  No scleral icterus  Neck: No JVD present  No tracheal deviation and no erythema present  Cardiovascular: Normal rate, regular rhythm, normal heart sounds, intact distal pulses and normal pulses  Exam reveals no gallop and no friction rub  No murmur heard  Pulmonary/Chest: Effort normal and breath sounds normal  No accessory muscle usage or stridor  No respiratory distress  She has no decreased breath sounds  She has no wheezes  She has no rales  Abdominal: Soft  Bowel sounds are normal  She exhibits no distension and no mass  There is tenderness in the epigastric area  There is no rebound     Musculoskeletal: She exhibits no edema, tenderness or deformity  Neurological: She is alert and oriented to person, place, and time  GCS eye subscore is 4  GCS verbal subscore is 5  GCS motor subscore is 6  Skin: Skin is warm and dry  No rash noted  She is not diaphoretic  No erythema  No pallor  Psychiatric: She has a normal mood and affect  Her behavior is normal    Vitals reviewed  Additional Data:     Lab Results: I have personally reviewed pertinent reports  Results from last 7 days  Lab Units 08/09/18  0322   WBC Thousand/uL 12 59*   HEMOGLOBIN g/dL 13 8   HEMATOCRIT % 41 1   PLATELETS Thousands/uL 401*   NEUTROS PCT % 42*   LYMPHS PCT % 49*   MONOS PCT % 5   EOS PCT % 3       Results from last 7 days  Lab Units 08/09/18  0322   SODIUM mmol/L 139   POTASSIUM mmol/L 3 1*   CHLORIDE mmol/L 102   CO2 mmol/L 24   BUN mg/dL 9   CREATININE mg/dL 0 76   CALCIUM mg/dL 9 6   TOTAL PROTEIN g/dL 7 9   BILIRUBIN TOTAL mg/dL 0 40   ALK PHOS U/L 95   ALT U/L 30   AST U/L 47*   GLUCOSE RANDOM mg/dL 102       Results from last 7 days  Lab Units 08/09/18  0322   INR  1 03               Imaging: I have personally reviewed pertinent reports  US gallbladder on 07/31/2018: Minimal gallbladder sludge, otherwise unremarkable study    CT abdomen pelvis with contrast:   1  Findings consistent with acute pancreatitis  No discrete pancreatic mass, evidence for necrosis, or dilatation of the main pancreatic duct  2   Mild ascites  3   No calcified gallstones or biliary ductal dilatation  EKG, Pathology, and Other Studies Reviewed on Admission: None        Allscripts / Epic Records Reviewed: Yes     ** Please Note: This note has been constructed using a voice recognition system   **

## 2018-08-09 NOTE — SOCIAL WORK
Met with patient to discuss the role of Care Management  Patient resides in a 3 rd floor apt with her fiancee  She is employed full time     She is independent of ADL's, uses no assistive device  She plans to return home and anticipates no discharge needs

## 2018-08-09 NOTE — PROGRESS NOTES
Pt received back from MRI  VSS  Continuing with upper epigastric pain of 1/10  Pt denies specific needs at this time  See flowsheet for assessment

## 2018-08-09 NOTE — CONSULTS
Kamaljit Lopez  [id number]    33 y o   female      ASSESSMENT   1  Acute pancreatitis, recurrent  Idiopathic  This constitutes her 4th episode over the past 3 years  This constitutes her 3rd hospitalization over the past 2 months for the same without any clear etiology  LFTs minimally elevated so will exclude choledocholithiasis  Possibly a passed common bile duct stone or small pancreatic cyst   Exclude peptic ulcer disease  Denies alcohol or smoking  Abdominal ultrasound negative for stones  CT scan of the abdomen pelvis on last admission which showed acute uncomplicated pancreatitis  Autoimmune pancreatitis has been excluded via serology, as well as hypercalcemia  Triglycerides are slightly elevated but not to the extent that would cause pancreatitis  No viral prodrome  No offending medication   PLAN  1  Aggressive IV fluids  2  IV analgesia/IV antiemetics  3  Bowel rest  4  MRCP today  If positive she will need an ERCP and if negative she will need an endoscopic ultrasound    Chief Complaint   Patient presents with    Abdominal Pain     pt presents to ER stating an hour ago she was woken up by mid abdominal pain  pt states she has had pancreatitis in the past and that it feels exactly like it did before  HPI   25-year-old female with a history of idiopathic pancreatitis  Hospitalized in June and July for the same  Developed an acute onset of recurrent and progressive midepigastric abdominal pain last evening that prompted emergency room visitation  Eight able serial and tomatoes for dinner  The episodes do seem to be precipitated postprandially  Associated nausea without vomiting  Denies any fevers, chills, diarrhea, melena, rectal bleeding, or change in bowels  Intentional weight loss of 40 lb over the past 2 years  Denies any heartburn, early satiety, dysphagia or odynophagia  Denies any new medications or NSAIDs      Past Medical History:   Diagnosis Date    Pancreatitis 2015       Past Surgical History:   Procedure Laterality Date    TONSILLECTOMY         Prescriptions Prior to Admission   Medication    cholecalciferol (VITAMIN D3) 1,000 units tablet    drospirenone-ethinyl estradiol (CAROLYN) 3-0 02 MG per tablet       No Known Allergies    Social History   Substance Use Topics    Smoking status: Never Smoker    Smokeless tobacco: Never Used    Alcohol use Yes      Comment: rarely       Family History   Problem Relation Age of Onset    Hypertension Father        Review of Systems  General ROS: negative for weight loss, fever, night sweats  Psychological ROS: negative for depression or anxiety  Ophthalmic ROS: negative for any eye issues  ENT ROS: no scleral icterus  Hematological and Lymphatic: no issues with bleeding or adenopathy  Respiratory ROS: no chest pain, shortness of breath or cough  Cardiovascular ROS: no issues with chest pain, heart palpitations  Gastrointestinal ROS: no issues with nausea, vomiting, diarrhea +abdominal pain  Genito-Urinary ROS: no issues with urinary burning, urinary frequency or hematuria  Neurological ROS:  no asterixis  Dermatological ROS: no skin rashes or lesions        /80   Pulse 73   Temp 98 4 °F (36 9 °C)   Resp 14   Ht 5' 3" (1 6 m)   Wt 64 6 kg (142 lb 6 7 oz)   LMP 08/06/2018   SpO2 99%   BMI 25 23 kg/m²       Physical Exam     Constitutional:  Well developed, well nourished, no acute distress, non-toxic appearance   Eyes:   conjunctiva normal   HENT:  Atraumatic  Respiratory:  No respiratory distress  Cardiovascular:  Normal rate  GI:  Soft, nondistended, normal bowel sounds, tender in midepigastric area, no rebound or guarding, no organomegaly, no mass, no rebound, no guarding   :  No costovertebral angle tenderness   Musculoskeletal:  No edema  Neurologic:  Alert & oriented x 3   Psychiatric:  Speech and behavior appropriate               Lab Results   Component Value Date    GLUCOSE 102 08/09/2018    INR 1 03 08/09/2018    CALCIUM 9 6 08/09/2018     08/09/2018    K 3 1 (L) 08/09/2018    CO2 24 08/09/2018     08/09/2018    BUN 9 08/09/2018    CREATININE 0 76 08/09/2018     Lab Results   Component Value Date    WBC 12 59 (H) 08/09/2018    HGB 13 8 08/09/2018    HCT 41 1 08/09/2018    MCV 94 08/09/2018     (H) 08/09/2018     Lab Results   Component Value Date    ALT 30 08/09/2018    AST 47 (H) 08/09/2018    ALKPHOS 95 08/09/2018    BILITOT 0 40 08/09/2018     Lab Results   Component Value Date    AMYLASE 580 (HH) 07/31/2018     Lab Results   Component Value Date    LIPASE 5,131 (H) 08/09/2018     No results found for: IRON, TIBC, FERRITIN  Lab Results   Component Value Date    INR 1 03 08/09/2018           Us Gallbladder    Result Date: 7/31/2018  Narrative: RIGHT UPPER QUADRANT ULTRASOUND INDICATION: Pancreatitis  COMPARISON: 6/4/2018  TECHNIQUE:   Real-time ultrasound of the right upper quadrant was performed with a curvilinear transducer with both volumetric sweeps and still imaging techniques  FINDINGS: PANCREAS:  Visualized portions show no abnormality  AORTA AND IVC:  Visualized portions are normal for patient age  LIVER: Normal size  Echogenicity and contour are normal  No evidence of mass  Normal directional flow is present within the portal vein  BILIARY: The gallbladder is normal in caliber  No wall thickening or pericholecystic fluid  Minimal gallbladder sludge is present  No sonographic Crooks's sign  No intrahepatic biliary dilatation  CBD measures 4 mm  No choledocholithiasis  KIDNEY: Right kidney measures 10 6 x 4 3 cm  Within normal limits  ASCITES:   None  Impression: Minimal gallbladder sludge, otherwise unremarkable study  Workstation performed: [id number]     Counseling / Coordination of Care  Total floor / unit time spent today 25 minutes  Greater than 50% of total time was spent with the patient and / or family counseling and / or coordination of care   A description of the counseling / coordination of care: 15      Ellie Baeza

## 2018-08-10 VITALS
OXYGEN SATURATION: 96 % | RESPIRATION RATE: 16 BRPM | HEART RATE: 88 BPM | BODY MASS INDEX: 25.12 KG/M2 | DIASTOLIC BLOOD PRESSURE: 66 MMHG | HEIGHT: 63 IN | SYSTOLIC BLOOD PRESSURE: 112 MMHG | WEIGHT: 141.76 LBS | TEMPERATURE: 97.9 F

## 2018-08-10 PROBLEM — E87.6 HYPOKALEMIA: Status: RESOLVED | Noted: 2018-06-02 | Resolved: 2018-08-10

## 2018-08-10 LAB
ANION GAP SERPL CALCULATED.3IONS-SCNC: 6 MMOL/L (ref 4–13)
BASOPHILS # BLD AUTO: 0.04 THOUSANDS/ΜL (ref 0–0.1)
BASOPHILS NFR BLD AUTO: 0 % (ref 0–1)
BUN SERPL-MCNC: 3 MG/DL (ref 5–25)
CALCIUM SERPL-MCNC: 8.9 MG/DL (ref 8.3–10.1)
CHLORIDE SERPL-SCNC: 107 MMOL/L (ref 100–108)
CO2 SERPL-SCNC: 27 MMOL/L (ref 21–32)
CREAT SERPL-MCNC: 0.73 MG/DL (ref 0.6–1.3)
EOSINOPHIL # BLD AUTO: 0.65 THOUSAND/ΜL (ref 0–0.61)
EOSINOPHIL NFR BLD AUTO: 6 % (ref 0–6)
ERYTHROCYTE [DISTWIDTH] IN BLOOD BY AUTOMATED COUNT: 12.7 % (ref 11.6–15.1)
EST. AVERAGE GLUCOSE BLD GHB EST-MCNC: 85 MG/DL
GFR SERPL CREATININE-BSD FRML MDRD: 113 ML/MIN/1.73SQ M
GLUCOSE SERPL-MCNC: 87 MG/DL (ref 65–140)
HBA1C MFR BLD: 4.6 % (ref 4.2–6.3)
HCT VFR BLD AUTO: 35.6 % (ref 34.8–46.1)
HGB BLD-MCNC: 11.7 G/DL (ref 11.5–15.4)
IMM GRANULOCYTES # BLD AUTO: 0.03 THOUSAND/UL (ref 0–0.2)
IMM GRANULOCYTES NFR BLD AUTO: 0 % (ref 0–2)
LIPASE SERPL-CCNC: 426 U/L (ref 73–393)
LYMPHOCYTES # BLD AUTO: 3.92 THOUSANDS/ΜL (ref 0.6–4.47)
LYMPHOCYTES NFR BLD AUTO: 38 % (ref 14–44)
MCH RBC QN AUTO: 31.3 PG (ref 26.8–34.3)
MCHC RBC AUTO-ENTMCNC: 32.9 G/DL (ref 31.4–37.4)
MCV RBC AUTO: 95 FL (ref 82–98)
MONOCYTES # BLD AUTO: 0.53 THOUSAND/ΜL (ref 0.17–1.22)
MONOCYTES NFR BLD AUTO: 5 % (ref 4–12)
NEUTROPHILS # BLD AUTO: 5.13 THOUSANDS/ΜL (ref 1.85–7.62)
NEUTS SEG NFR BLD AUTO: 51 % (ref 43–75)
NRBC BLD AUTO-RTO: 0 /100 WBCS
PLATELET # BLD AUTO: 282 THOUSANDS/UL (ref 149–390)
PMV BLD AUTO: 11.1 FL (ref 8.9–12.7)
POTASSIUM SERPL-SCNC: 3.7 MMOL/L (ref 3.5–5.3)
RBC # BLD AUTO: 3.74 MILLION/UL (ref 3.81–5.12)
SODIUM SERPL-SCNC: 140 MMOL/L (ref 136–145)
WBC # BLD AUTO: 10.3 THOUSAND/UL (ref 4.31–10.16)

## 2018-08-10 PROCEDURE — 99239 HOSP IP/OBS DSCHRG MGMT >30: CPT | Performed by: HOSPITALIST

## 2018-08-10 PROCEDURE — 83690 ASSAY OF LIPASE: CPT | Performed by: PHYSICIAN ASSISTANT

## 2018-08-10 PROCEDURE — 83036 HEMOGLOBIN GLYCOSYLATED A1C: CPT | Performed by: PHYSICIAN ASSISTANT

## 2018-08-10 PROCEDURE — 85025 COMPLETE CBC W/AUTO DIFF WBC: CPT | Performed by: PHYSICIAN ASSISTANT

## 2018-08-10 PROCEDURE — 80048 BASIC METABOLIC PNL TOTAL CA: CPT | Performed by: PHYSICIAN ASSISTANT

## 2018-08-10 RX ADMIN — SODIUM CHLORIDE 100 ML/HR: 0.9 INJECTION, SOLUTION INTRAVENOUS at 02:02

## 2018-08-10 NOTE — PROGRESS NOTES
Pt ambulated to bathroom  Assessment as charted  AVSS  Afebrile  Only mild tenderness of RUQ/epigastric area, but patient does not even rate or quantify as pain at this time  Nausea/vomitting  No bowel movement since Saturday, however states "she's not uncomfortable  Abdomen soft  Bowel sounds active  IVFs infusing  And fluids at bedside

## 2018-08-10 NOTE — ASSESSMENT & PLAN NOTE
-Recently D/Cd on 7/31/18 but did not undergo MRCP   -MRI/MRCP: Prominent pancreatic tail with adjacent peripancreatic fluid, consistent with distal pancreatitis  Normal appearance of the biliary tree without evidence of choledocholithiasis    -was on IV fluids  -Lipase down to 426 from 5131  -patient tolerated a regular diet prior to discharge without abdominal pain  -her pancreatitis is possibly due to [address] [place] [address] Street which she is stopping  -outpatient GI follow-up for autoimmune testing

## 2018-08-10 NOTE — PROGRESS NOTES
Discharge instructions taught to pt  All questions answered  Pt verbalized understanding  Discharge instructions provided to pt  Pt has all belongings  IV removed  Awaiting ride  Pt denies specific needs at this time

## 2018-08-10 NOTE — PROGRESS NOTES
Sho Dorman  [id number]    33 y o   female      ASSESSMENT  1  Acute pancreatitis, recurrent   Idiopathic   This constitutes her 4th episode over the past 3 years  This constitutes her 3rd hospitalization over the past 2 months for the same without any clear etiology   Choledocholithiasis excluded via MRCP  Possibly a passed common bile duct stone or small pancreatic cyst   Exclude peptic ulcer disease  Denies alcohol or smoking   Abdominal ultrasound negative for stones   CT scan of the abdomen pelvis on last admission which showed acute uncomplicated pancreatitis    Autoimmune pancreatitis has been excluded via serology, as well as hypercalcemia   Triglycerides are slightly elevated but not to the extent that would cause pancreatitis   No viral prodrome   Suspect oral contraception with "Trupti" as this is the only new medication since March        PLAN  1  Discontinue "Trupti"  2  She will see her gynecologist shortly after discharge for an alternative oral contraception versus Depo injection or alternative contraception  3  Advance diet to low-fat    Chief Complaint   Patient presents with    Abdominal Pain     pt presents to ER stating an hour ago she was woken up by mid abdominal pain  pt states she has had pancreatitis in the past and that it feels exactly like it did before  SUBJECTIVE/HPI   Feels 100% better  No further abdominal pain  Denies any nausea or vomiting      BP (P) 112/66   Pulse (P) 88   Temp (P) 97 9 °F (36 6 °C)   Resp (P) 16   Ht 5' 3" (1 6 m)   Wt (P) 64 3 kg (141 lb 12 1 oz)   LMP 08/06/2018   SpO2 (P) 96%   BMI (P) 25 11 kg/m²       PHYSICALEXAM  Constitutional:  Well developed, well nourished, no acute distress, non-toxic appearance   Eyes:  conjunctiva normal   HENT:  Atraumatic   Respiratory:  No respiratory distress  Cardiovascular:  Normal rate  GI:  Soft, nondistended, normal bowel sounds, nontender  Musculoskeletal:  No edema  Neurologic:  Alert & oriented x 3 Psychiatric:  Speech and behavior appropriate       Lab Results   Component Value Date    GLUCOSE 87 08/10/2018    CALCIUM 8 9 08/10/2018     08/10/2018    K 3 7 08/10/2018    CO2 27 08/10/2018     08/10/2018    BUN 3 (L) 08/10/2018    CREATININE 0 73 08/10/2018     Lab Results   Component Value Date    WBC 10 30 (H) 08/10/2018    HGB 11 7 08/10/2018    HCT 35 6 08/10/2018    MCV 95 08/10/2018     08/10/2018     Lab Results   Component Value Date    ALT 30 08/09/2018    AST 47 (H) 08/09/2018    ALKPHOS 95 08/09/2018    BILITOT 0 40 08/09/2018     Lab Results   Component Value Date    AMYLASE 580 (HH) 07/31/2018     Lab Results   Component Value Date    LIPASE 426 (H) 08/10/2018     No results found for: IRON, TIBC, FERRITIN  Lab Results   Component Value Date    INR 1 03 08/09/2018       Counseling / Coordination of Care  Total floor / unit time spent today 25 minutes  Greater than 50% of total time was spent with the patient and / or family counseling and / or coordination of care  A description of the counseling / coordination of care: 15    Virgil Courtney

## 2018-08-10 NOTE — PLAN OF CARE

## 2018-08-10 NOTE — DISCHARGE SUMMARY
Discharge- Michelle Rodrigues 1990, 33 y o  female MRN: [id number]    Unit/Bed#: -01 Encounter: [id number]    Primary Care Provider: Hiral Barragan DO   Date and time admitted to hospital: 8/9/2018  2:30 AM        Hyperglycemia   Assessment & Plan    ·  on admission  · Likely secondary to pancreatitis  · A1c P        * Recurrent acute pancreatitis   Assessment & Plan    -Recently D/Cd on 7/31/18 but did not undergo MRCP   -MRI/MRCP: Prominent pancreatic tail with adjacent peripancreatic fluid, consistent with distal pancreatitis  Normal appearance of the biliary tree without evidence of choledocholithiasis  -was on IV fluids  -Lipase down to 426 from 5131  -patient tolerated a regular diet prior to discharge without abdominal pain  -her pancreatitis is possibly due to [address] [place] [address] Street which she is stopping  -outpatient GI follow-up for autoimmune testing          Discharging Physician / Practitioner: Tina Hart MD  PCP: Hiral Barragan DO  Admission Date:   Admission Orders     Ordered        08/09/18 0413  Inpatient Admission (expected length of stay for this patient is greater than two midnights)  Once             Discharge Date: 08/10/18    Resolved Problems  Date Reviewed: 8/10/2018    None          Consultations During Hospital Stay:  · Gastroenterology    Procedures Performed:     · None    Significant Findings / Test Results:     · See above    Incidental Findings:   · None     Test Results Pending at Discharge (will require follow up): · None     Outpatient Tests Requested:  · None    Complications:  None    Reason for Admission:  Pancreatitis    Hospital Course:     Michelle Rodrigues is a 33 y o  female patient who originally presented to the hospital on 8/9/2018 due to pancreatitis as outlined in the H&P done on admission  Please see above list of diagnoses and related plan for additional information       Condition at Discharge: good     Discharge Day Visit / Exam:     Subjective:  Patient denies abdominal pain at this time  Vitals: Blood Pressure: 112/66 (08/10/18 0622)  Pulse: 88 (08/10/18 0622)  Temperature: 97 9 °F (36 6 °C) (08/10/18 0622)  Temp Source: Tympanic (08/10/18 0500)  Respirations: 16 (08/10/18 0622)  Height: 5' 3" (160 cm) (08/09/18 2022)  Weight - Scale: 64 3 kg (141 lb 12 1 oz) (08/10/18 0622)  SpO2: 96 % (08/10/18 0622)  Exam:   Physical Exam  Gen: NAD, AAOx3, well developed, well nourished  Eyes: EOMI, PERRLA, no scleral icterus  ENMT:  Oropharynx clear of erythema or exudates, no nasal discharge, no otic discharge, moist mucous membranes  Neck:  Supple  Cardiovascular:  Regular rate and rhythm, normal S1-S2, no murmurs, rubs, or gallops  Lungs:  Clear to auscultation bilaterally, no wheezes, or rales, or rhonchi  Abdomen:  Positive bowel sounds, soft, nontender, nondistended, no palpable organomegaly   Skin:  Intact, no obvious lesions or rashes, no edema  Neuro: Cranial nerves 2-12 are intact, non-focal, 5/5 strength in all 4 extremities      Discharge instructions/Information to patient and family:   See after visit summary for information provided to patient and family  Provisions for Follow-Up Care:  See after visit summary for information related to follow-up care and any pertinent home health orders  Disposition:     Home    For Discharges to [facility] SNF:   · Not Applicable to this Patient - Not Applicable to this Patient    Planned Readmission: None     Discharge Statement:  I spent 30 minutes discharging the patient  This time was spent on the day of discharge  I had direct contact with the patient on the day of discharge  Greater than 50% of the total time was spent examining patient, answering all patient questions, arranging and discussing plan of care with patient as well as directly providing post-discharge instructions  Additional time then spent on discharge activities      Discharge Medications:  See after visit summary for reconciled discharge medications provided to patient and family        ** Please Note: This note has been constructed using a voice recognition system **

## 2018-08-16 ENCOUNTER — HOSPITAL ENCOUNTER (INPATIENT)
Facility: HOSPITAL | Age: 28
LOS: 3 days | Discharge: HOME | DRG: 440 | End: 2018-08-19
Attending: EMERGENCY MEDICINE | Admitting: INTERNAL MEDICINE
Payer: COMMERCIAL

## 2018-08-16 DIAGNOSIS — K85.90 RECURRENT ACUTE PANCREATITIS: Primary | ICD-10-CM

## 2018-08-16 DIAGNOSIS — K85.00 IDIOPATHIC ACUTE PANCREATITIS WITHOUT INFECTION OR NECROSIS: ICD-10-CM

## 2018-08-16 LAB
ALBUMIN SERPL BCP-MCNC: 3.8 G/DL (ref 3.5–5)
ALP SERPL-CCNC: 96 U/L (ref 46–116)
ALT SERPL W P-5'-P-CCNC: 33 U/L (ref 12–78)
ANION GAP SERPL CALCULATED.3IONS-SCNC: 10 MMOL/L (ref 4–13)
AST SERPL W P-5'-P-CCNC: 68 U/L (ref 5–45)
BACTERIA UR QL AUTO: ABNORMAL /HPF
BASOPHILS # BLD AUTO: 0.06 THOUSANDS/ΜL (ref 0–0.1)
BASOPHILS NFR BLD AUTO: 0 % (ref 0–1)
BILIRUB SERPL-MCNC: 0.7 MG/DL (ref 0.2–1)
BILIRUB UR QL STRIP: NEGATIVE
BUN SERPL-MCNC: 9 MG/DL (ref 5–25)
CALCIUM SERPL-MCNC: 9.8 MG/DL (ref 8.3–10.1)
CHLORIDE SERPL-SCNC: 100 MMOL/L (ref 100–108)
CLARITY UR: CLEAR
CO2 SERPL-SCNC: 27 MMOL/L (ref 21–32)
COLOR UR: YELLOW
CREAT SERPL-MCNC: 0.76 MG/DL (ref 0.6–1.3)
EOSINOPHIL # BLD AUTO: 0.32 THOUSAND/ΜL (ref 0–0.61)
EOSINOPHIL NFR BLD AUTO: 2 % (ref 0–6)
ERYTHROCYTE [DISTWIDTH] IN BLOOD BY AUTOMATED COUNT: 12.9 % (ref 11.6–15.1)
EXT PREG TEST URINE: NEGATIVE
GFR SERPL CREATININE-BSD FRML MDRD: 108 ML/MIN/1.73SQ M
GLUCOSE SERPL-MCNC: 107 MG/DL (ref 65–140)
GLUCOSE UR STRIP-MCNC: NEGATIVE MG/DL
HCT VFR BLD AUTO: 45.3 % (ref 34.8–46.1)
HGB BLD-MCNC: 15.4 G/DL (ref 11.5–15.4)
HGB UR QL STRIP.AUTO: ABNORMAL
IMM GRANULOCYTES # BLD AUTO: 0.04 THOUSAND/UL (ref 0–0.2)
IMM GRANULOCYTES NFR BLD AUTO: 0 % (ref 0–2)
KETONES UR STRIP-MCNC: NEGATIVE MG/DL
LEUKOCYTE ESTERASE UR QL STRIP: NEGATIVE
LIPASE SERPL-CCNC: ABNORMAL U/L (ref 73–393)
LYMPHOCYTES # BLD AUTO: 3.97 THOUSANDS/ΜL (ref 0.6–4.47)
LYMPHOCYTES NFR BLD AUTO: 25 % (ref 14–44)
MCH RBC QN AUTO: 31.6 PG (ref 26.8–34.3)
MCHC RBC AUTO-ENTMCNC: 34 G/DL (ref 31.4–37.4)
MCV RBC AUTO: 93 FL (ref 82–98)
MONOCYTES # BLD AUTO: 1.12 THOUSAND/ΜL (ref 0.17–1.22)
MONOCYTES NFR BLD AUTO: 7 % (ref 4–12)
MUCOUS THREADS UR QL AUTO: ABNORMAL
NEUTROPHILS # BLD AUTO: 10.54 THOUSANDS/ΜL (ref 1.85–7.62)
NEUTS SEG NFR BLD AUTO: 66 % (ref 43–75)
NITRITE UR QL STRIP: NEGATIVE
NON-SQ EPI CELLS URNS QL MICRO: ABNORMAL /HPF
PH UR STRIP.AUTO: 7 [PH] (ref 4.5–8)
PLATELET # BLD AUTO: 515 THOUSANDS/UL (ref 149–390)
PMV BLD AUTO: 11.2 FL (ref 8.9–12.7)
POTASSIUM SERPL-SCNC: 4.6 MMOL/L (ref 3.5–5.3)
PROT SERPL-MCNC: 8.2 G/DL (ref 6.4–8.2)
PROT UR STRIP-MCNC: NEGATIVE MG/DL
RBC # BLD AUTO: 4.88 MILLION/UL (ref 3.81–5.12)
RBC #/AREA URNS AUTO: ABNORMAL /HPF
SODIUM SERPL-SCNC: 137 MMOL/L (ref 136–145)
SP GR UR STRIP.AUTO: 1.01 (ref 1–1.03)
UROBILINOGEN UR QL STRIP.AUTO: 0.2 E.U./DL
WBC # BLD AUTO: 16.05 THOUSAND/UL (ref 4.31–10.16)
WBC #/AREA URNS AUTO: ABNORMAL /HPF

## 2018-08-16 PROCEDURE — 96375 TX/PRO/DX INJ NEW DRUG ADDON: CPT

## 2018-08-16 PROCEDURE — 81001 URINALYSIS AUTO W/SCOPE: CPT | Performed by: PHYSICIAN ASSISTANT

## 2018-08-16 PROCEDURE — 83690 ASSAY OF LIPASE: CPT | Performed by: PHYSICIAN ASSISTANT

## 2018-08-16 PROCEDURE — 94664 DEMO&/EVAL PT USE INHALER: CPT

## 2018-08-16 PROCEDURE — 85025 COMPLETE CBC W/AUTO DIFF WBC: CPT | Performed by: PHYSICIAN ASSISTANT

## 2018-08-16 PROCEDURE — 80053 COMPREHEN METABOLIC PANEL: CPT | Performed by: PHYSICIAN ASSISTANT

## 2018-08-16 PROCEDURE — 96374 THER/PROPH/DIAG INJ IV PUSH: CPT

## 2018-08-16 PROCEDURE — 36415 COLL VENOUS BLD VENIPUNCTURE: CPT | Performed by: PHYSICIAN ASSISTANT

## 2018-08-16 PROCEDURE — 81025 URINE PREGNANCY TEST: CPT | Performed by: PHYSICIAN ASSISTANT

## 2018-08-16 PROCEDURE — 96361 HYDRATE IV INFUSION ADD-ON: CPT

## 2018-08-16 PROCEDURE — 99284 EMERGENCY DEPT VISIT MOD MDM: CPT

## 2018-08-16 PROCEDURE — 99222 1ST HOSP IP/OBS MODERATE 55: CPT | Performed by: INTERNAL MEDICINE

## 2018-08-16 RX ORDER — ZOLPIDEM TARTRATE 5 MG/1
5 TABLET ORAL
Status: DISCONTINUED | OUTPATIENT
Start: 2018-08-16 | End: 2018-08-19 | Stop reason: HOSPADM

## 2018-08-16 RX ORDER — KETOROLAC TROMETHAMINE 30 MG/ML
15 INJECTION, SOLUTION INTRAMUSCULAR; INTRAVENOUS ONCE
Status: COMPLETED | OUTPATIENT
Start: 2018-08-16 | End: 2018-08-16

## 2018-08-16 RX ORDER — SODIUM CHLORIDE 9 MG/ML
125 INJECTION, SOLUTION INTRAVENOUS CONTINUOUS
Status: DISCONTINUED | OUTPATIENT
Start: 2018-08-16 | End: 2018-08-16

## 2018-08-16 RX ORDER — PROMETHAZINE HYDROCHLORIDE 25 MG/ML
25 INJECTION, SOLUTION INTRAMUSCULAR; INTRAVENOUS EVERY 4 HOURS PRN
Status: DISCONTINUED | OUTPATIENT
Start: 2018-08-16 | End: 2018-08-19 | Stop reason: HOSPADM

## 2018-08-16 RX ORDER — PROMETHAZINE HYDROCHLORIDE 25 MG/ML
12.5 INJECTION, SOLUTION INTRAMUSCULAR; INTRAVENOUS EVERY 6 HOURS PRN
Status: DISCONTINUED | OUTPATIENT
Start: 2018-08-16 | End: 2018-08-16

## 2018-08-16 RX ORDER — ONDANSETRON 2 MG/ML
4 INJECTION INTRAMUSCULAR; INTRAVENOUS ONCE
Status: COMPLETED | OUTPATIENT
Start: 2018-08-16 | End: 2018-08-16

## 2018-08-16 RX ORDER — SODIUM CHLORIDE, SODIUM LACTATE, POTASSIUM CHLORIDE, CALCIUM CHLORIDE 600; 310; 30; 20 MG/100ML; MG/100ML; MG/100ML; MG/100ML
50 INJECTION, SOLUTION INTRAVENOUS CONTINUOUS
Status: DISCONTINUED | OUTPATIENT
Start: 2018-08-16 | End: 2018-08-19 | Stop reason: HOSPADM

## 2018-08-16 RX ORDER — PANTOPRAZOLE SODIUM 40 MG/1
40 INJECTION, POWDER, FOR SOLUTION INTRAVENOUS
Status: DISCONTINUED | OUTPATIENT
Start: 2018-08-17 | End: 2018-08-19 | Stop reason: HOSPADM

## 2018-08-16 RX ADMIN — PROMETHAZINE HYDROCHLORIDE 25 MG: 25 INJECTION INTRAMUSCULAR; INTRAVENOUS at 22:23

## 2018-08-16 RX ADMIN — SODIUM CHLORIDE, SODIUM LACTATE, POTASSIUM CHLORIDE, AND CALCIUM CHLORIDE 250 ML/HR: .6; .31; .03; .02 INJECTION, SOLUTION INTRAVENOUS at 18:02

## 2018-08-16 RX ADMIN — HYDROMORPHONE HYDROCHLORIDE 0.5 MG: 1 INJECTION, SOLUTION INTRAMUSCULAR; INTRAVENOUS; SUBCUTANEOUS at 17:14

## 2018-08-16 RX ADMIN — ONDANSETRON 4 MG: 2 INJECTION INTRAMUSCULAR; INTRAVENOUS at 13:25

## 2018-08-16 RX ADMIN — KETOROLAC TROMETHAMINE 15 MG: 30 INJECTION, SOLUTION INTRAMUSCULAR; INTRAVENOUS at 13:25

## 2018-08-16 RX ADMIN — ONDANSETRON 4 MG: 2 INJECTION, SOLUTION INTRAMUSCULAR; INTRAVENOUS at 16:34

## 2018-08-16 RX ADMIN — SODIUM CHLORIDE 1000 ML: 0.9 INJECTION, SOLUTION INTRAVENOUS at 13:27

## 2018-08-16 RX ADMIN — HYDROMORPHONE HYDROCHLORIDE 0.25 MG: 1 INJECTION, SOLUTION INTRAMUSCULAR; INTRAVENOUS; SUBCUTANEOUS at 14:35

## 2018-08-16 RX ADMIN — HYDROMORPHONE HYDROCHLORIDE 1 MG: 1 INJECTION, SOLUTION INTRAMUSCULAR; INTRAVENOUS; SUBCUTANEOUS at 22:45

## 2018-08-16 RX ADMIN — SODIUM CHLORIDE 125 ML/HR: 0.9 INJECTION, SOLUTION INTRAVENOUS at 16:36

## 2018-08-16 RX ADMIN — SODIUM CHLORIDE, SODIUM LACTATE, POTASSIUM CHLORIDE, AND CALCIUM CHLORIDE 250 ML/HR: .6; .31; .03; .02 INJECTION, SOLUTION INTRAVENOUS at 22:31

## 2018-08-16 RX ADMIN — HYDROMORPHONE HYDROCHLORIDE 1 MG: 1 INJECTION, SOLUTION INTRAMUSCULAR; INTRAVENOUS; SUBCUTANEOUS at 18:51

## 2018-08-16 RX ADMIN — PROMETHAZINE HYDROCHLORIDE 12.5 MG: 25 INJECTION INTRAMUSCULAR; INTRAVENOUS at 17:20

## 2018-08-16 NOTE — PROGRESS NOTES
Patient continues to have abdominal pain  Diluadid given IV  Patient has hiccups frequently  No vomiting currently but still nauseous  IV phenergan given

## 2018-08-16 NOTE — H&P
History and Physical - Selene Taylor 33 y o  female MRN: [id number]  Unit/Bed#: [address] [place] [address] Encounter: [id number]    Assessment/Plan     Assessment:  Principal Problem:    Idiopathic acute pancreatitis without infection or necrosis      Plan:  Admit greater than 2 midnight stay  IV fluids and p o  pain medications  GI consultation  I see no reason at this point time to repeat imaging  History of Present Illness   HPI: Selene Taylor is a 33y o  year old female who presents with recurrent pancreatitis  This is the 4th admission for pancreatitis since early June  Patient has had complete workup  It was felt this is either secondary to oral contraceptive or idiopathic  Her last dose of oral contraceptive was 8 days ago  Patient woke this morning developed abdominal pain vomited x3 and presented back emergency room  She has a very elevated amylase  She has had extensive workup including MRCP the last admission  She clearly denies alcohol intake  Review of Systems   All other systems reviewed and are negative  Historical Information   Past Medical History:   Diagnosis Date    Pancreatitis 2015     Past Surgical History:   Procedure Laterality Date    TONSILLECTOMY       Social History   History   Alcohol Use    Yes     Comment: rarely     History   Drug Use No     History   Smoking Status    Never Smoker   Smokeless Tobacco    Never Used     Family History:   Family History   Problem Relation Age of Onset    Hypertension Father        Meds/Allergies      No current facility-administered medications for this encounter  Prescriptions Prior to Admission   Medication    cholecalciferol (VITAMIN D3) 1,000 units tablet     No Known Allergies    Objective   Vitals: Blood pressure 145/75, pulse 76, temperature 97 8 °F (36 6 °C), temperature source Oral, resp  rate 16, height 5' 3" (1 6 m), weight 64 3 kg (141 lb 12 1 oz), last menstrual period 08/06/2018, SpO2 100 %    No intake or output data in the 24 hours ending 08/16/18 1608  Invasive Devices          No matching active lines, drains, or airways          Physical Exam   Constitutional: She appears well-developed and well-nourished  Cardiovascular: Normal rate and regular rhythm  Pulmonary/Chest: Effort normal and breath sounds normal    Abdominal: Bowel sounds are normal  She exhibits no distension (Periumbilical)  There is tenderness  Musculoskeletal: She exhibits no edema  Vitals reviewed        Lab Results:   Admission on 08/16/2018   Component Date Value    Sodium 08/16/2018 137     Potassium 08/16/2018 4 6     Chloride 08/16/2018 100     CO2 08/16/2018 27     Anion Gap 08/16/2018 10     BUN 08/16/2018 9     Creatinine 08/16/2018 0 76     Glucose 08/16/2018 107     Calcium 08/16/2018 9 8     AST 08/16/2018 68*    ALT 08/16/2018 33     Alkaline Phosphatase 08/16/2018 96     Total Protein 08/16/2018 8 2     Albumin 08/16/2018 3 8     Total Bilirubin 08/16/2018 0 70     eGFR 08/16/2018 108     WBC 08/16/2018 16 05*    RBC 08/16/2018 4 88     Hemoglobin 08/16/2018 15 4     Hematocrit 08/16/2018 45 3     MCV 08/16/2018 93     MCH 08/16/2018 31 6     MCHC 08/16/2018 34 0     RDW 08/16/2018 12 9     MPV 08/16/2018 11 2     Platelets 77/82/2428 515*    Neutrophils Relative 08/16/2018 66     Immat GRANS % 08/16/2018 0     Lymphocytes Relative 08/16/2018 25     Monocytes Relative 08/16/2018 7     Eosinophils Relative 08/16/2018 2     Basophils Relative 08/16/2018 0     Neutrophils Absolute 08/16/2018 10 54*    Immature Grans Absolute 08/16/2018 0 04     Lymphocytes Absolute 08/16/2018 3 97     Monocytes Absolute 08/16/2018 1 12     Eosinophils Absolute 08/16/2018 0 32     Basophils Absolute 08/16/2018 0 06     Lipase 08/16/2018 >37131*    Color, UA 08/16/2018 Yellow     Clarity, UA 08/16/2018 Clear     Specific Gravity, UA 08/16/2018 1 015     pH, UA 08/16/2018 7 0     Leukocytes, UA 08/16/2018 Negative     Nitrite, UA 08/16/2018 Negative     Protein, UA 08/16/2018 Negative     Glucose, UA 08/16/2018 Negative     Ketones, UA 08/16/2018 Negative     Urobilinogen, UA 08/16/2018 0 2     Bilirubin, UA 08/16/2018 Negative     Blood, UA 08/16/2018 Trace-Intact*    EXT PREG TEST UR (Ref: N* 08/16/2018 negative     RBC, UA 08/16/2018 1-2*    WBC, UA 08/16/2018 None Seen     Epithelial Cells 08/16/2018 Occasional     Bacteria, UA 08/16/2018 Occasional     MUCOUS THREADS 08/16/2018 Occasional*     Imaging Studies: I have personally reviewed pertinent reports  EKG, Pathology, and Other Studies: I have personally reviewed pertinent reports  VTE  Prophylaxis: Algorithmic determination of appropriate prophylaxis determined  This note has been constructed using a voice recognition system         José Luis Krause MD

## 2018-08-16 NOTE — ED PROVIDER NOTES
History  Chief Complaint   Patient presents with    Abdominal Pain     pt presents to ED c/o of abdominal pain rates pain 10/10     32 yo female w/ hx of recurrent idiopathic pancreatitis presents to the Emergency Department for evaluation of sudden onset, sharp, non radiating epigastric pain (rated 10/10) associated w/ NBNB emesis  Began approx 1 hr PTA  Has been admitted to this facility three times in the past 3 months for acute pancreatitis  During last admission, she underwent an MRCP which showed prominent pancreatic tail with adjacent peripancreatic fluid, consistent with distal pancreatitis; Normal appearance of the biliary tree without evidence of choledocholithiasis  She reports that current symptoms are "the exact same" as previous episodes  Has not seen GI in follow up  Denies EtOH use  No fever  No longer taking Trupti birth control  No prior abd surgeries  Prior to Admission Medications   Prescriptions Last Dose Informant Patient Reported? Taking? cholecalciferol (VITAMIN D3) 1,000 units tablet   Yes No   Sig: Take 1,000 Units by mouth once a week        Facility-Administered Medications: None       Past Medical History:   Diagnosis Date    Pancreatitis 2015       Past Surgical History:   Procedure Laterality Date    TONSILLECTOMY         Family History   Problem Relation Age of Onset    Hypertension Father      I have reviewed and agree with the history as documented  Social History   Substance Use Topics    Smoking status: Never Smoker    Smokeless tobacco: Never Used    Alcohol use Yes      Comment: rarely        Review of Systems   Constitutional: Positive for chills  Negative for diaphoresis and fever  Respiratory: Negative for chest tightness and shortness of breath  Cardiovascular: Negative for chest pain and palpitations  Gastrointestinal: Positive for abdominal pain, nausea and vomiting  Negative for blood in stool, constipation and diarrhea     Genitourinary: Negative for dysuria, flank pain, frequency and hematuria  Musculoskeletal: Negative for arthralgias, back pain and myalgias  Skin: Negative for color change and rash  Allergic/Immunologic: Negative for immunocompromised state  Neurological: Negative for dizziness and light-headedness  Hematological: Does not bruise/bleed easily  Psychiatric/Behavioral: Negative for confusion  Physical Exam  Physical Exam   Constitutional: She appears well-developed and well-nourished  No distress  In fetal position, visibly in pain holding hands over abdomen   HENT:   Head: Normocephalic and atraumatic  Eyes: Pupils are equal, round, and reactive to light  No scleral icterus  Cardiovascular: Normal rate and regular rhythm  Exam reveals no gallop and no friction rub  No murmur heard  Pulmonary/Chest: No respiratory distress  She has no wheezes  She has no rales  Abdominal: Soft  Normal appearance and bowel sounds are normal  She exhibits no abdominal bruit and no mass  There is no hepatomegaly  There is tenderness in the right upper quadrant and epigastric area  There is guarding  There is no rigidity, no rebound, no CVA tenderness, no tenderness at McBurney's point and negative Crooks's sign  Skin: Skin is warm  Capillary refill takes less than 2 seconds  She is diaphoretic  Psychiatric: She has a normal mood and affect  Her behavior is normal    Vitals reviewed        Vital Signs  ED Triage Vitals [08/16/18 1311]   Temperature Pulse Respirations Blood Pressure SpO2   98 2 °F (36 8 °C) 95 16 144/95 98 %      Temp Source Heart Rate Source Patient Position - Orthostatic VS BP Location FiO2 (%)   Temporal Monitor Lying Right arm --      Pain Score       Worst Possible Pain           Vitals:    08/16/18 1415 08/16/18 1430 08/16/18 1500 08/16/18 1546   BP: 135/83  134/88 145/75   Pulse: 76 74 65 76   Patient Position - Orthostatic VS:    Lying       Visual Acuity      ED Medications  Medications sodium chloride 0 9 % infusion (not administered)   sodium chloride 0 9 % infusion (not administered)   ondansetron (ZOFRAN) injection 4 mg (not administered)   zolpidem (AMBIEN) tablet 5 mg (not administered)   HYDROmorphone (DILAUDID) injection 0 5 mg (not administered)   pantoprazole (PROTONIX) injection 40 mg (not administered)   ondansetron (ZOFRAN) injection 4 mg (4 mg Intravenous Given 8/16/18 1325)   sodium chloride 0 9 % bolus 1,000 mL (0 mL Intravenous Stopped 8/16/18 1428)   ketorolac (TORADOL) injection 15 mg (15 mg Intravenous Given 8/16/18 1325)   HYDROmorphone (DILAUDID) injection 0 25 mg (0 25 mg Intravenous Given 8/16/18 1435)       Diagnostic Studies  Results Reviewed     Procedure Component Value Units Date/Time    Urine Microscopic [[id number]]  (Abnormal) Collected:  08/16/18 1435    Lab Status:  Final result Specimen:  Urine from Urine, Clean Catch Updated:  08/16/18 1505     RBC, UA 1-2 (A) /hpf      WBC, UA None Seen /hpf      Epithelial Cells Occasional /hpf      Bacteria, UA Occasional /hpf      MUCOUS THREADS Occasional (A)    UA w Reflex to Microscopic [[id number]]  (Abnormal) Collected:  08/16/18 1435    Lab Status:  Final result Specimen:  Urine from Urine, Clean Catch Updated:  08/16/18 1454     Color, UA Yellow     Clarity, UA Clear     Specific Gravity, UA 1 015     pH, UA 7 0     Leukocytes, UA Negative     Nitrite, UA Negative     Protein, UA Negative mg/dl      Glucose, UA Negative mg/dl      Ketones, UA Negative mg/dl      Urobilinogen, UA 0 2 E U /dl      Bilirubin, UA Negative     Blood, UA Trace-Intact (A)    Lipase [[id number]]  (Abnormal) Collected:  08/16/18 1328    Lab Status:  Final result Specimen:  Blood from Arm, Left Updated:  08/16/18 1444     Lipase >75,000 (H) u/L     POCT pregnancy, urine [[id number]]  (Normal) Resulted:  08/16/18 1440    Lab Status:  Final result Updated:  08/16/18 1440     EXT PREG TEST UR (Ref: Negative) negative    Comprehensive metabolic panel [[id number]]  (Abnormal) Collected:  08/16/18 1328    Lab Status:  Final result Specimen:  Blood from Arm, Left Updated:  08/16/18 1406     Sodium 137 mmol/L      Potassium 4 6 mmol/L      Chloride 100 mmol/L      CO2 27 mmol/L      Anion Gap 10 mmol/L      BUN 9 mg/dL      Creatinine 0 76 mg/dL      Glucose 107 mg/dL      Calcium 9 8 mg/dL      AST 68 (H) U/L      ALT 33 U/L      Alkaline Phosphatase 96 U/L      Total Protein 8 2 g/dL      Albumin 3 8 g/dL      Total Bilirubin 0 70 mg/dL      eGFR 108 ml/min/1 73sq m     Narrative:         National Kidney Disease Education Program recommendations are as follows:  GFR calculation is accurate only with a steady state creatinine  Chronic Kidney disease less than 60 ml/min/1 73 sq  meters  Kidney failure less than 15 ml/min/1 73 sq  meters  CBC and differential [[id number]]  (Abnormal) Collected:  08/16/18 1328    Lab Status:  Final result Specimen:  Blood from Arm, Left Updated:  08/16/18 1339     WBC 16 05 (H) Thousand/uL      RBC 4 88 Million/uL      Hemoglobin 15 4 g/dL      Hematocrit 45 3 %      MCV 93 fL      MCH 31 6 pg      MCHC 34 0 g/dL      RDW 12 9 %      MPV 11 2 fL      Platelets 309 (H) Thousands/uL      Neutrophils Relative 66 %      Immat GRANS % 0 %      Lymphocytes Relative 25 %      Monocytes Relative 7 %      Eosinophils Relative 2 %      Basophils Relative 0 %      Neutrophils Absolute 10 54 (H) Thousands/µL      Immature Grans Absolute 0 04 Thousand/uL      Lymphocytes Absolute 3 97 Thousands/µL      Monocytes Absolute 1 12 Thousand/µL      Eosinophils Absolute 0 32 Thousand/µL      Basophils Absolute 0 06 Thousands/µL                  No orders to display              Procedures  Procedures       Phone Contacts  ED Phone Contact    ED Course  ED Course as of Aug 16 1624   Thu Aug 16, 2018   1326 34 yo female w/ recent hx of recurrent pancreatitis presents w/ acute epigastric pain, N/V  Mildly tachycardic, vitals otherwise WNL   Exam reveals moderate epigastric/RUQ tenderness, non rigid abdomen, no peritoneal signs  Normal cardiopulmonary exam  Plan for basic labs, lipase, UA, HCG, reassess  Will obtain CT abdomen/pelvis if lipase is not elevated  1454 Case d/w Dr Chapis Boyd, who accepts pt for admission to med/surg                                [facility]  Number of Diagnoses or Management Options  Recurrent acute pancreatitis: established and worsening  Diagnosis management comments: 34 yo female presents w/ acute epigastric pain, nausea and vomiting  Afebrile on arrival w/ normal VS  Recent hx of recurrent idiopathic pancreatitis  Labs reveal markedly elevated lipase >02518, mild leukocytosis w/o left shift, reactive thrombocytopenia  Normal LFTs, no indication for repeat imaging at this time  Will admit to med/surg, Dr Chapis Boyd accepting  Amount and/or Complexity of Data Reviewed  Clinical lab tests: ordered and reviewed  Tests in the medicine section of CPT®: ordered and reviewed  Review and summarize past medical records: yes  Discuss the patient with other providers: yes (Dr Chapis Boyd)    Patient Progress  Patient progress: stable    CritCare Time    Disposition  Final diagnoses:   Recurrent acute pancreatitis     Time reflects when diagnosis was documented in both MDM as applicable and the Disposition within this note     Time User Action Codes Description Comment    8/16/2018  2:46 PM Park Lula Add [K85 90] Recurrent acute pancreatitis       ED Disposition     ED Disposition Condition Comment    Admit  Case was discussed with Dr Chapis Boyd and the patient's admission status was agreed to be Admission Status: inpatient status to the service of Dr Chapis Boyd   Follow-up Information    None         Current Discharge Medication List      CONTINUE these medications which have NOT CHANGED    Details   cholecalciferol (VITAMIN D3) 1,000 units tablet Take 1,000 Units by mouth once a week             No discharge procedures on file      ED Provider  Electronically Signed by           Casimiro Mills PA-C  08/16/18 2517

## 2018-08-16 NOTE — PLAN OF CARE
GASTROINTESTINAL - ADULT     Minimal or absence of nausea and/or vomiting Progressing     Maintains or returns to baseline bowel function Progressing     Maintains adequate nutritional intake Progressing        Nutrition/Hydration-ADULT     Nutrient/Hydration intake appropriate for improving, restoring or maintaining nutritional needs Progressing

## 2018-08-17 PROBLEM — G44.89 OTHER HEADACHE SYNDROME: Status: ACTIVE | Noted: 2018-08-17

## 2018-08-17 PROBLEM — D72.829 LEUKOCYTOSIS: Status: ACTIVE | Noted: 2018-08-17

## 2018-08-17 LAB
ANION GAP SERPL CALCULATED.3IONS-SCNC: 6 MMOL/L (ref 4–13)
BUN SERPL-MCNC: 9 MG/DL (ref 5–25)
CALCIUM SERPL-MCNC: 8.8 MG/DL (ref 8.3–10.1)
CHLORIDE SERPL-SCNC: 104 MMOL/L (ref 100–108)
CO2 SERPL-SCNC: 27 MMOL/L (ref 21–32)
CREAT SERPL-MCNC: 0.76 MG/DL (ref 0.6–1.3)
ERYTHROCYTE [DISTWIDTH] IN BLOOD BY AUTOMATED COUNT: 12.3 % (ref 11.6–15.1)
GFR SERPL CREATININE-BSD FRML MDRD: 108 ML/MIN/1.73SQ M
GLUCOSE SERPL-MCNC: 115 MG/DL (ref 65–140)
HCT VFR BLD AUTO: 45.3 % (ref 34.8–46.1)
HGB BLD-MCNC: 14.9 G/DL (ref 11.5–15.4)
LIPASE SERPL-CCNC: 5282 U/L (ref 73–393)
MCH RBC QN AUTO: 31.1 PG (ref 26.8–34.3)
MCHC RBC AUTO-ENTMCNC: 32.9 G/DL (ref 31.4–37.4)
MCV RBC AUTO: 95 FL (ref 82–98)
PLATELET # BLD AUTO: 415 THOUSANDS/UL (ref 149–390)
PMV BLD AUTO: 11.1 FL (ref 8.9–12.7)
POTASSIUM SERPL-SCNC: 4.3 MMOL/L (ref 3.5–5.3)
RBC # BLD AUTO: 4.79 MILLION/UL (ref 3.81–5.12)
SODIUM SERPL-SCNC: 137 MMOL/L (ref 136–145)
WBC # BLD AUTO: 16.81 THOUSAND/UL (ref 4.31–10.16)

## 2018-08-17 PROCEDURE — 80048 BASIC METABOLIC PNL TOTAL CA: CPT | Performed by: INTERNAL MEDICINE

## 2018-08-17 PROCEDURE — C9113 INJ PANTOPRAZOLE SODIUM, VIA: HCPCS | Performed by: INTERNAL MEDICINE

## 2018-08-17 PROCEDURE — 83690 ASSAY OF LIPASE: CPT | Performed by: INTERNAL MEDICINE

## 2018-08-17 PROCEDURE — 85027 COMPLETE CBC AUTOMATED: CPT | Performed by: INTERNAL MEDICINE

## 2018-08-17 PROCEDURE — 99232 SBSQ HOSP IP/OBS MODERATE 35: CPT | Performed by: INTERNAL MEDICINE

## 2018-08-17 RX ORDER — SUMATRIPTAN 50 MG/1
50 TABLET, FILM COATED ORAL 2 TIMES DAILY PRN
Status: DISCONTINUED | OUTPATIENT
Start: 2018-08-17 | End: 2018-08-19 | Stop reason: HOSPADM

## 2018-08-17 RX ORDER — ACETAMINOPHEN 500 MG
1500 TABLET ORAL EVERY 8 HOURS PRN
COMMUNITY
End: 2018-08-19 | Stop reason: HOSPADM

## 2018-08-17 RX ORDER — NAPROXEN 250 MG/1
250 TABLET ORAL 2 TIMES DAILY WITH MEALS
Status: DISCONTINUED | OUTPATIENT
Start: 2018-08-17 | End: 2018-08-19 | Stop reason: HOSPADM

## 2018-08-17 RX ADMIN — PROMETHAZINE HYDROCHLORIDE 25 MG: 25 INJECTION INTRAMUSCULAR; INTRAVENOUS at 15:56

## 2018-08-17 RX ADMIN — HYDROMORPHONE HYDROCHLORIDE 1 MG: 1 INJECTION, SOLUTION INTRAMUSCULAR; INTRAVENOUS; SUBCUTANEOUS at 03:19

## 2018-08-17 RX ADMIN — SODIUM CHLORIDE, SODIUM LACTATE, POTASSIUM CHLORIDE, AND CALCIUM CHLORIDE 250 ML/HR: .6; .31; .03; .02 INJECTION, SOLUTION INTRAVENOUS at 06:16

## 2018-08-17 RX ADMIN — SUMATRIPTAN SUCCINATE 50 MG: 50 TABLET, FILM COATED ORAL at 14:05

## 2018-08-17 RX ADMIN — SODIUM CHLORIDE, SODIUM LACTATE, POTASSIUM CHLORIDE, AND CALCIUM CHLORIDE 250 ML/HR: .6; .31; .03; .02 INJECTION, SOLUTION INTRAVENOUS at 02:33

## 2018-08-17 RX ADMIN — PANTOPRAZOLE SODIUM 40 MG: 40 INJECTION, POWDER, FOR SOLUTION INTRAVENOUS at 09:15

## 2018-08-17 RX ADMIN — NAPROXEN 250 MG: 250 TABLET ORAL at 16:01

## 2018-08-17 RX ADMIN — PROMETHAZINE HYDROCHLORIDE 25 MG: 25 INJECTION INTRAMUSCULAR; INTRAVENOUS at 02:42

## 2018-08-17 RX ADMIN — PROMETHAZINE HYDROCHLORIDE 25 MG: 25 INJECTION INTRAMUSCULAR; INTRAVENOUS at 06:37

## 2018-08-17 RX ADMIN — SODIUM CHLORIDE, SODIUM LACTATE, POTASSIUM CHLORIDE, AND CALCIUM CHLORIDE 250 ML/HR: .6; .31; .03; .02 INJECTION, SOLUTION INTRAVENOUS at 10:58

## 2018-08-17 NOTE — CONSULTS
Consultation - GI   Kamaljit Lopez 33 y o  female MRN: [id number]  Unit/Bed#: [address] [place] [address] Encounter: [id number]    Consults  ASSESSMENT    Acute pancreatitis, recurrent   Idiopathic     Fourth admission since June  Denies alcohol and tobacco   MRCP negative for choledocholithiasis     IgG 4 level normal  Minimally elevated triglycerides last admission  No signs of chronic pancreatitis on imaging  Her oral contraceptive was discontinued during the last hospitalization  PLAN  Will treat acute pancreatitis with bowel rest, IV fluids, antiemetics, analgesics as needed  Will add pancreatic enzymes when she starts to take p o     Will arrange outpatient endoscopic ultrasound, will discuss timing with advanced endoscopist     Discussed with Internal Medicine  Long discussion with patient's mom at bedside  Chief Complaint   Patient presents with    Abdominal Pain     pt presents to ED c/o of abdominal pain rates pain 10/10       HPI   The patient is known to me from recent hospitalization with recurrent, acute pancreatitis  Imaging has been negative for chronic pancreatitis and stones  Outpatient endoscopic ultrasound has been discussed  She was discharged home August 10th with no abdominal pain and minimal nausea  She was tolerating a low-fat diet without difficulty  Her pain recurred yesterday around noon  Symptoms are identical to prior flares  She denies any new medications  She discontinued her oral contraceptive (thanh) during the last hospitalization          Past Medical History:   Diagnosis Date    Pancreatitis 2015       Past Surgical History:   Procedure Laterality Date    TONSILLECTOMY         Prescriptions Prior to Admission   Medication    cholecalciferol (VITAMIN D3) 1,000 units tablet       No Known Allergies    Social History     Family History   Problem Relation Age of Onset    Hypertension Father        8 Point Review of Systems - Negative except abdominal pain and nausea      /79 (BP Location: Left arm)   Pulse 98   Temp 98 3 °F (36 8 °C) (Oral)   Resp 18   Ht 5' 3" (1 6 m)   Wt 64 3 kg (141 lb 12 1 oz)   LMP 08/06/2018   SpO2 98%   BMI 25 11 kg/m²     PHYSICALEXAM  General appearance: alert, appears stated age and cooperative  Head: Normocephalic, without obvious abnormality, atraumatic  Lungs: clear to auscultation bilaterally  Heart: regular rate and rhythm, S1, S2 normal, no murmur, click, rub or gallop  Abdomen: soft, non-tender; bowel sounds normal; no masses,  no organomegaly  Extremities: extremities normal, atraumatic, no cyanosis or edema  Neurologic: Grossly normal    Lab Results   Component Value Date    GLUCOSE 115 08/17/2018    CALCIUM 8 8 08/17/2018     08/17/2018    K 4 3 08/17/2018    CO2 27 08/17/2018     08/17/2018    BUN 9 08/17/2018    CREATININE 0 76 08/17/2018     Lab Results   Component Value Date    WBC 16 81 (H) 08/17/2018    HGB 14 9 08/17/2018    HCT 45 3 08/17/2018    MCV 95 08/17/2018     (H) 08/17/2018     Lab Results   Component Value Date    ALT 33 08/16/2018    AST 68 (H) 08/16/2018    ALKPHOS 96 08/16/2018    BILITOT 0 70 08/16/2018     Lab Results   Component Value Date    AMYLASE 580 ([place]) 07/31/2018     Lab Results   Component Value Date    LIPASE 5,282 (H) 08/17/2018     No results found for: IRON, TIBC, FERRITIN  Lab Results   Component Value Date    INR 1 03 08/09/2018

## 2018-08-17 NOTE — PROGRESS NOTES
Progress Note - Lucy Jett 33 y o  female MRN: [id number]    Unit/Bed#: [address] [place] [address] Encounter: [id number]      Assessment:  Principal Problem:    Idiopathic acute pancreatitis without infection or necrosis  Active Problems:    Recurrent acute pancreatitis    Leukocytosis    Other headache syndrome  Resolved Problems:    * No resolved hospital problems  *        Plan:  · Acute sffbybwcmcrj-blmmtdoui-zqtwjiwnsuwa related to birth control pills yet as verses underlying ductal issues-as per Dr Zeynep Campos, will arrange for an outpatient endoscopic ultrasound with Dr Alfonzo Mclaughlin in the future  Lipase decreased from 75,000 now at 5282  · Headache-patient complaining of some frontal headache and does have some history of migraines  Will give her nap person currently and if she has more migraine symptoms will give her Maxalt  · Leukocytosis-still elevated 16,810 today   ·   · Subjective:   Patient complains of some bifrontal headache and ongoing abdominal pain  Is more localized in the midepigastric and does not complain any radiation to the back  She denies lightheadedness or dizziness  No vision changes  ROS  Comprehensive system review negative other than noted above    Objective:     Vitals: Blood pressure 133/79, pulse 98, temperature 98 3 °F (36 8 °C), temperature source Oral, resp  rate 18, height 5' 3" (1 6 m), weight 64 3 kg (141 lb 12 1 oz), last menstrual period 08/06/2018, SpO2 98 %  ,Body mass index is 25 11 kg/m²    Current Facility-Administered Medications   Medication Dose Route Frequency Provider Last Rate Last Dose    HYDROmorphone (DILAUDID) injection 1 mg  1 mg Intravenous Q3H PRN Jo Bethea MD   1 mg at 08/17/18 0319    lactated ringers infusion  250 mL/hr Intravenous Continuous Rafia Monteiro  mL/hr at 08/17/18 1058 250 mL/hr at 08/17/18 1058    pantoprazole (PROTONIX) injection 40 mg  40 mg Intravenous Q24H [address] Jo Bethea MD   40 mg at 08/17/18 0915    promethazine (PHENERGAN) injection 25 mg  25 mg Intravenous Q4H PRN Leodan CarloCARLOS   25 mg at 08/17/18 1460    zolpidem (AMBIEN) tablet 5 mg  5 mg Oral HS PRN Padmini Shepherd MD         Prescriptions Prior to Admission   Medication    cholecalciferol (VITAMIN D3) 1,000 units tablet         Intake/Output Summary (Last 24 hours) at 08/17/18 1146  Last data filed at 08/17/18 1058   Gross per 24 hour   Intake          4104 17 ml   Output              780 ml   Net          3324 17 ml       Physical Exam:  General appearance: alert and moderate distress  Neck: no adenopathy, no carotid bruit, no JVD, supple, symmetrical, trachea midline and thyroid not enlarged, symmetric, no tenderness/mass/nodules  Lungs: clear to auscultation bilaterally  Heart: regular rate and rhythm, S1, S2 normal, no murmur, click, rub or gallop  Abdomen:  Tenderness in the midepigastric region extending into the periumbilical area  Some minimal lower abdominal tenderness  Active bowel sounds  Some guarding noted  Extremities: extremities normal, warm and well-perfused; no cyanosis, clubbing, or edema  Skin: Skin color, texture, turgor normal  No rashes or lesions  Neurologic: Grossly normal       Lab, Imaging and other studies: I have personally reviewed pertinent reports              Results from last 7 days  Lab Units 08/17/18  0518 08/16/18  1328   WBC Thousand/uL 16 81* 16 05*   HEMOGLOBIN g/dL 14 9 15 4   HEMATOCRIT % 45 3 45 3   PLATELETS Thousands/uL 415* 515*   NEUTROS PCT %  --  66   LYMPHS PCT %  --  25   MONOS PCT %  --  7   EOS PCT %  --  2       Results from last 7 days  Lab Units 08/17/18  0518 08/16/18  1328   SODIUM mmol/L 137 137   POTASSIUM mmol/L 4 3 4 6   CHLORIDE mmol/L 104 100   CO2 mmol/L 27 27   BUN mg/dL 9 9   CREATININE mg/dL 0 76 0 76   CALCIUM mg/dL 8 8 9 8   TOTAL PROTEIN g/dL  --  8 2   BILIRUBIN TOTAL mg/dL  --  0 70   ALK PHOS U/L  --  96   ALT U/L  --  33   AST U/L  --  68*   GLUCOSE RANDOM mg/dL 115 107     No results found for: TROPONINI, CKMB, CKTOTAL      No results found for: Rebel Payer, SPUTUMCULTUR    Imaging:  No results found for this or any previous visit  No results found for this or any previous visit  PATIENT CENTERED ROUNDS: I have performed rounds with the nursing staff            Montse Abreu,

## 2018-08-17 NOTE — PROGRESS NOTES
Patient reports improvement with her pain today but she still feels a little nauseous  Patient's mother at bedside

## 2018-08-17 NOTE — CASE MANAGEMENT
Initial Clinical Review    Admission: Date/Time/Statement: 8/16/18 @ 1447     Orders Placed This Encounter   Procedures    Inpatient Admission (expected length of stay for this patient is greater than two midnights)     Standing Status:   Standing     Number of Occurrences:   1     Order Specific Question:   Admitting Physician     Answer:   Evaristo Majano [[id number]]     Order Specific Question:   Level of Care     Answer:   Med Surg [[id number]]     Order Specific Question:   Estimated length of stay     Answer:   More than 2 Midnights     Order Specific Question:   Certification     Answer:   I certify that inpatient services are medically necessary for this patient for a duration of greater than two midnights  See H&P and MD Progress Notes for additional information about the patient's course of treatment  ED: Date/Time/Mode of Arrival:   ED Arrival Information     Expected Arrival Acuity Means of Arrival Escorted By Service Admission Type    - 8/16/2018 13:02 Urgent Walk-In [facility] Medicine Urgent    Arrival Complaint    abd pain, vomiting          Chief Complaint:   Chief Complaint   Patient presents with    Abdominal Pain     pt presents to ED c/o of abdominal pain rates pain 10/10       History of Illness:  33y o  year old female who presents with recurrent pancreatitis  This is the 4th admission for pancreatitis since early June  Patient has had complete workup  It was felt this is either secondary to oral contraceptive or idiopathic  Her last dose of oral contraceptive was 8 days ago  Patient woke this morning developed abdominal pain vomited x3 and presented back emergency room        ED Vital Signs:   ED Triage Vitals [08/16/18 1311]   Temperature Pulse Respirations Blood Pressure SpO2   98 2 °F (36 8 °C) 95 16 144/95 98 %      Temp Source Heart Rate Source Patient Position - Orthostatic VS BP Location FiO2 (%)   Temporal Monitor Lying Right arm --      Pain Score       Worst Possible Pain        Wt Readings from Last 1 Encounters:   08/16/18 64 3 kg (141 lb 12 1 oz)     Abnormal Labs/Diagnostic Test Results: Lipase >75,000, AST 68, WBC 16 05, Platelets 157    ED Treatment:   Medication Administration from 08/16/2018 1302 to 08/16/2018 1535       Date/Time Order Dose Route Action Action by Comments     08/16/2018 1325 ondansetron (ZOFRAN) injection 4 mg 4 mg Intravenous Given Angelica Chaparro RN      08/16/2018 1428 sodium chloride 0 9 % bolus 1,000 mL 0 mL Intravenous Stopped Angelica Chaparro RN      08/16/2018 1327 sodium chloride 0 9 % bolus 1,000 mL 1,000 mL Intravenous New [address] Angelica Chaparro RN      08/16/2018 1325 ketorolac (TORADOL) injection 15 mg 15 mg Intravenous Given Angelica Chaparro RN      08/16/2018 1435 HYDROmorphone (DILAUDID) injection 0 25 mg 0 25 mg Intravenous Given Angelica Chaparro RN           Past Medical/Surgical History: Active Ambulatory Problems     Diagnosis Date Noted    Idiopathic acute pancreatitis without infection or necrosis 06/02/2018    Recurrent acute pancreatitis 07/30/2018    Hyperglycemia 08/09/2018     Resolved Ambulatory Problems     Diagnosis Date Noted    Hypokalemia 06/02/2018     Past Medical History:   Diagnosis Date    Pancreatitis 2015       Admitting Diagnosis: Recurrent acute pancreatitis [K85 90]  Vomiting [R11 10]    Age/Sex: 33 y o  female    Assessment/Plan:     Principal Problem:    Idiopathic acute pancreatitis without infection or necrosis        Plan:  Admit greater than 2 midnight stay      IV fluids and p o  pain medications  GI consultation      I see no reason at this point time to repeat imaging       Admission Orders:  Inpatient/MedSurg  Consult GI r/e recurrent acute pancretitis   Bilateral Sequential Compression Device  NPO, sips with meds  LR @ 250    Scheduled Meds:   Current Facility-Administered Medications:  pantoprazole 40 mg Intravenous Q24H [address]     IV Dilaudid 1 mg x 3 thus far  IV Phenergan 12 5 mg x 1 thus far  IV Phenergan 25 mg x 3 thus far

## 2018-08-17 NOTE — SOCIAL WORK
Met with patient  Explained role of care management  Patient lives with [address] in a third floor apartment  She is independent adl's, drives, works FT  DME - denies  Past services - denies  She plans on returning home at discharge and does not anticipate any discharge needs  Will follow

## 2018-08-17 NOTE — PROGRESS NOTES
Patient forgot to mention that she takes Tylenol at home  Three 500 mg tablets three times a day as needed  Patient denies taking any other OTC medications

## 2018-08-17 NOTE — PLAN OF CARE
DISCHARGE PLANNING - CARE MANAGEMENT     Discharge to post-acute care or home with appropriate resources Progressing        GASTROINTESTINAL - ADULT     Minimal or absence of nausea and/or vomiting Progressing     Maintains or returns to baseline bowel function Progressing     Maintains adequate nutritional intake Progressing        Nutrition/Hydration-ADULT     Nutrient/Hydration intake appropriate for improving, restoring or maintaining nutritional needs Progressing

## 2018-08-17 NOTE — PROGRESS NOTES
Pt observed to be sleeping for mod intervals overnight during hrly rounds between assessment and med admin  Nausea with sm amts emesis x2  Dilaudid x1 fo rc/o abd pain

## 2018-08-18 PROBLEM — K85.00 IDIOPATHIC ACUTE PANCREATITIS WITHOUT INFECTION OR NECROSIS: Status: RESOLVED | Noted: 2018-06-02 | Resolved: 2018-08-18

## 2018-08-18 LAB
ANION GAP SERPL CALCULATED.3IONS-SCNC: 5 MMOL/L (ref 4–13)
BUN SERPL-MCNC: 5 MG/DL (ref 5–25)
CALCIUM SERPL-MCNC: 8.6 MG/DL (ref 8.3–10.1)
CHLORIDE SERPL-SCNC: 103 MMOL/L (ref 100–108)
CO2 SERPL-SCNC: 30 MMOL/L (ref 21–32)
CREAT SERPL-MCNC: 0.8 MG/DL (ref 0.6–1.3)
ERYTHROCYTE [DISTWIDTH] IN BLOOD BY AUTOMATED COUNT: 13.4 % (ref 11.6–15.1)
GFR SERPL CREATININE-BSD FRML MDRD: 101 ML/MIN/1.73SQ M
GLUCOSE SERPL-MCNC: 89 MG/DL (ref 65–140)
HCT VFR BLD AUTO: 39.7 % (ref 34.8–46.1)
HGB BLD-MCNC: 13.1 G/DL (ref 11.5–15.4)
LIPASE SERPL-CCNC: 1385 U/L (ref 73–393)
MCH RBC QN AUTO: 31.6 PG (ref 26.8–34.3)
MCHC RBC AUTO-ENTMCNC: 33 G/DL (ref 31.4–37.4)
MCV RBC AUTO: 96 FL (ref 82–98)
PLATELET # BLD AUTO: 360 THOUSANDS/UL (ref 149–390)
PMV BLD AUTO: 10.8 FL (ref 8.9–12.7)
POTASSIUM SERPL-SCNC: 3.7 MMOL/L (ref 3.5–5.3)
RBC # BLD AUTO: 4.15 MILLION/UL (ref 3.81–5.12)
SODIUM SERPL-SCNC: 138 MMOL/L (ref 136–145)
WBC # BLD AUTO: 16.7 THOUSAND/UL (ref 4.31–10.16)

## 2018-08-18 PROCEDURE — 80048 BASIC METABOLIC PNL TOTAL CA: CPT | Performed by: INTERNAL MEDICINE

## 2018-08-18 PROCEDURE — C9113 INJ PANTOPRAZOLE SODIUM, VIA: HCPCS | Performed by: INTERNAL MEDICINE

## 2018-08-18 PROCEDURE — 99232 SBSQ HOSP IP/OBS MODERATE 35: CPT | Performed by: INTERNAL MEDICINE

## 2018-08-18 PROCEDURE — 85027 COMPLETE CBC AUTOMATED: CPT | Performed by: INTERNAL MEDICINE

## 2018-08-18 PROCEDURE — 83690 ASSAY OF LIPASE: CPT | Performed by: INTERNAL MEDICINE

## 2018-08-18 RX ADMIN — SODIUM CHLORIDE, SODIUM LACTATE, POTASSIUM CHLORIDE, AND CALCIUM CHLORIDE 250 ML/HR: .6; .31; .03; .02 INJECTION, SOLUTION INTRAVENOUS at 06:26

## 2018-08-18 RX ADMIN — SODIUM CHLORIDE, SODIUM LACTATE, POTASSIUM CHLORIDE, AND CALCIUM CHLORIDE 250 ML/HR: .6; .31; .03; .02 INJECTION, SOLUTION INTRAVENOUS at 09:32

## 2018-08-18 RX ADMIN — NAPROXEN 250 MG: 250 TABLET ORAL at 18:26

## 2018-08-18 RX ADMIN — PANCRELIPASE 24000 UNITS: 24000; 76000; 120000 CAPSULE, DELAYED RELEASE PELLETS ORAL at 18:26

## 2018-08-18 RX ADMIN — NAPROXEN 250 MG: 250 TABLET ORAL at 09:31

## 2018-08-18 RX ADMIN — PANTOPRAZOLE SODIUM 40 MG: 40 INJECTION, POWDER, FOR SOLUTION INTRAVENOUS at 09:28

## 2018-08-18 NOTE — PROGRESS NOTES
Progress Note - Maryjane Méndez 33 y o  female MRN: [id number]    Unit/Bed#: [address] [place] [address] Encounter: [id number]      Assessment:    Principal Problem:    Recurrent acute pancreatitis  Active Problems:    Leukocytosis    Other headache syndrome  Resolved Problems:    Idiopathic acute pancreatitis without infection or necrosis      Plan:  Check labs which had not been ordered yet  As well as tomorrow  Start surgical soft diet  Subjective:   Feels better  Less pain    Objective:     Vitals: Blood pressure 105/56, pulse 104, temperature 99 7 °F (37 6 °C), temperature source Tympanic, resp  rate 18, height 5' 3" (1 6 m), weight 66 9 kg (147 lb 7 8 oz), last menstrual period 08/06/2018, SpO2 97 %  ,Body mass index is 26 13 kg/m²  Intake/Output Summary (Last 24 hours) at 08/18/18 0936  Last data filed at 08/18/18 6340   Gross per 24 hour   Intake           2267 5 ml   Output                0 ml   Net           2267 5 ml       Physical Exam:    Alert and awake in no acute distress  Lungs clear to auscultation bilaterally  Heart regular rate and rythm, normal heart sounds  Abdomen soft, active bowel sounds, less epigastric tenderness  Extremities: no cyanosis, clubbing or edema  Skin: warm, dry, no discrete lesions        Invasive Devices     Peripheral Intravenous Line            Peripheral IV 08/16/18 Left Antecubital 2 days                            Lab, Imaging and other studies: I have personally reviewed pertinent reports         Results from last 7 days  Lab Units 08/17/18  0518 08/16/18  1328   WBC Thousand/uL 16 81* 16 05*   HEMOGLOBIN g/dL 14 9 15 4   HEMATOCRIT % 45 3 45 3   PLATELETS Thousands/uL 415* 515*   NEUTROS PCT %  --  66   LYMPHS PCT %  --  25   MONOS PCT %  --  7   EOS PCT %  --  2       Results from last 7 days  Lab Units 08/17/18  0518 08/16/18  1328   SODIUM mmol/L 137 137   POTASSIUM mmol/L 4 3 4 6   CHLORIDE mmol/L 104 100   CO2 mmol/L 27 27   BUN mg/dL 9 9   CREATININE mg/dL 0 76 0  76   CALCIUM mg/dL 8 8 9 8   TOTAL PROTEIN g/dL  --  8 2   BILIRUBIN TOTAL mg/dL  --  0 70   ALK PHOS U/L  --  96   ALT U/L  --  33   AST U/L  --  68*   GLUCOSE RANDOM mg/dL 115 107     No results found for: TROPONINI, CKMB, CKTOTAL      No results found for: Darene Liter, SPUTUMCULTUR    Imaging:  No results found for this or any previous visit  No results found for this or any previous visit  PATIENT CENTERED ROUNDS: I have performed rounds with the nursing staff  This note has been constructed using a voice recognition system      Mynor Peng MD

## 2018-08-18 NOTE — PROGRESS NOTES
Jeronimo Hart  [id number]    33 y o   female      ASSESSMENT     Acute pancreatitis, recurrent   Idiopathic     Fourth admission since June  Denies alcohol and tobacco   MRCP negative for choledocholithiasis     IgG 4 level normal  Minimally elevated triglycerides last admission  No signs of chronic pancreatitis on imaging  Her oral contraceptive was discontinued during the last hospitalization  Clinically improving with supportive care and bowel rest        PLAN    Advance to low-fat diet  I added Creon before meals  If she tolerates the diet without nausea, vomiting or recurrence of pain should be stable for discharge tomorrow  I communicated with the advanced endoscopist at [facility] yesterday  Will arrange outpatient consult followed by endoscopic ultrasound when she recovers from this acute episode  Chief Complaint   Patient presents with    Abdominal Pain     pt presents to ED c/o of abdominal pain rates pain 10/10       SUBJECTIVE/HPI   No nausea or vomiting  Much less pain      /56 (BP Location: Right arm)   Pulse 104   Temp 99 7 °F (37 6 °C) (Tympanic)   Resp 18   Ht 5' 3" (1 6 m)   Wt 66 9 kg (147 lb 7 8 oz)   LMP 08/06/2018   SpO2 97%   BMI 26 13 kg/m²     PHYSICALEXAM  General appearance: alert, appears stated age and cooperative  Head: Normocephalic, without obvious abnormality, atraumatic  Lungs: clear to auscultation bilaterally  Heart: regular rate and rhythm, S1, S2 normal, no murmur, click, rub or gallop  Abdomen: soft, non-tender; bowel sounds normal; no masses,  no organomegaly  Extremities: extremities normal, atraumatic, no cyanosis or edema  Neurologic: Grossly normal    Lab Results   Component Value Date    GLUCOSE 89 08/18/2018    CALCIUM 8 6 08/18/2018     08/18/2018    K 3 7 08/18/2018    CO2 30 08/18/2018     08/18/2018    BUN 5 08/18/2018    CREATININE 0 80 08/18/2018     Lab Results   Component Value Date    WBC 16 70 (H) 08/18/2018    HGB 13 1 08/18/2018    HCT 39 7 08/18/2018    MCV 96 08/18/2018     08/18/2018     Lab Results   Component Value Date    ALT 33 08/16/2018    AST 68 (H) 08/16/2018    ALKPHOS 96 08/16/2018    BILITOT 0 70 08/16/2018     Lab Results   Component Value Date    AMYLASE 580 ([place]) 07/31/2018     Lab Results   Component Value Date    LIPASE 1,385 (H) 08/18/2018     No results found for: IRON, TIBC, FERRITIN  Lab Results   Component Value Date    INR 1 03 08/09/2018

## 2018-08-19 VITALS
DIASTOLIC BLOOD PRESSURE: 63 MMHG | OXYGEN SATURATION: 95 % | BODY MASS INDEX: 26.13 KG/M2 | SYSTOLIC BLOOD PRESSURE: 106 MMHG | HEIGHT: 63 IN | WEIGHT: 147.49 LBS | RESPIRATION RATE: 18 BRPM | HEART RATE: 96 BPM | TEMPERATURE: 98.7 F

## 2018-08-19 LAB
ANION GAP SERPL CALCULATED.3IONS-SCNC: 6 MMOL/L (ref 4–13)
BASOPHILS # BLD AUTO: 0.07 THOUSANDS/ΜL (ref 0–0.1)
BASOPHILS NFR BLD AUTO: 1 % (ref 0–1)
BUN SERPL-MCNC: 6 MG/DL (ref 5–25)
CALCIUM SERPL-MCNC: 8.6 MG/DL (ref 8.3–10.1)
CHLORIDE SERPL-SCNC: 107 MMOL/L (ref 100–108)
CO2 SERPL-SCNC: 28 MMOL/L (ref 21–32)
CREAT SERPL-MCNC: 0.59 MG/DL (ref 0.6–1.3)
EOSINOPHIL # BLD AUTO: 1.07 THOUSAND/ΜL (ref 0–0.61)
EOSINOPHIL NFR BLD AUTO: 7 % (ref 0–6)
ERYTHROCYTE [DISTWIDTH] IN BLOOD BY AUTOMATED COUNT: 13 % (ref 11.6–15.1)
GFR SERPL CREATININE-BSD FRML MDRD: 126 ML/MIN/1.73SQ M
GLUCOSE SERPL-MCNC: 94 MG/DL (ref 65–140)
HCT VFR BLD AUTO: 33.3 % (ref 34.8–46.1)
HGB BLD-MCNC: 10.8 G/DL (ref 11.5–15.4)
IMM GRANULOCYTES # BLD AUTO: 0.07 THOUSAND/UL (ref 0–0.2)
IMM GRANULOCYTES NFR BLD AUTO: 1 % (ref 0–2)
LIPASE SERPL-CCNC: 571 U/L (ref 73–393)
LYMPHOCYTES # BLD AUTO: 3.35 THOUSANDS/ΜL (ref 0.6–4.47)
LYMPHOCYTES NFR BLD AUTO: 23 % (ref 14–44)
MCH RBC QN AUTO: 31.8 PG (ref 26.8–34.3)
MCHC RBC AUTO-ENTMCNC: 32.4 G/DL (ref 31.4–37.4)
MCV RBC AUTO: 98 FL (ref 82–98)
MONOCYTES # BLD AUTO: 0.93 THOUSAND/ΜL (ref 0.17–1.22)
MONOCYTES NFR BLD AUTO: 6 % (ref 4–12)
NEUTROPHILS # BLD AUTO: 9.35 THOUSANDS/ΜL (ref 1.85–7.62)
NEUTS SEG NFR BLD AUTO: 62 % (ref 43–75)
NRBC BLD AUTO-RTO: 0 /100 WBCS
PLATELET # BLD AUTO: 317 THOUSANDS/UL (ref 149–390)
PMV BLD AUTO: 11.4 FL (ref 8.9–12.7)
POTASSIUM SERPL-SCNC: 3.4 MMOL/L (ref 3.5–5.3)
RBC # BLD AUTO: 3.4 MILLION/UL (ref 3.81–5.12)
SODIUM SERPL-SCNC: 141 MMOL/L (ref 136–145)
WBC # BLD AUTO: 14.84 THOUSAND/UL (ref 4.31–10.16)

## 2018-08-19 PROCEDURE — 80048 BASIC METABOLIC PNL TOTAL CA: CPT | Performed by: INTERNAL MEDICINE

## 2018-08-19 PROCEDURE — 83690 ASSAY OF LIPASE: CPT | Performed by: INTERNAL MEDICINE

## 2018-08-19 PROCEDURE — 99238 HOSP IP/OBS DSCHRG MGMT 30/<: CPT | Performed by: INTERNAL MEDICINE

## 2018-08-19 PROCEDURE — C9113 INJ PANTOPRAZOLE SODIUM, VIA: HCPCS | Performed by: INTERNAL MEDICINE

## 2018-08-19 PROCEDURE — 85025 COMPLETE CBC W/AUTO DIFF WBC: CPT | Performed by: INTERNAL MEDICINE

## 2018-08-19 RX ADMIN — PANCRELIPASE 24000 UNITS: 24000; 76000; 120000 CAPSULE, DELAYED RELEASE PELLETS ORAL at 09:41

## 2018-08-19 RX ADMIN — PANTOPRAZOLE SODIUM 40 MG: 40 INJECTION, POWDER, FOR SOLUTION INTRAVENOUS at 09:39

## 2018-08-19 RX ADMIN — NAPROXEN 250 MG: 250 TABLET ORAL at 09:40

## 2018-08-19 NOTE — PROGRESS NOTES
Bridgette Hobson  [id number]    33 y o   female      ASSESSMENT     Acute pancreatitis, recurrent   Idiopathic     Fourth admission since June   Denies alcohol and tobacco   MRCP negative for choledocholithiasis     IgG 4 level normal  Minimally elevated triglycerides last admission   No signs of chronic pancreatitis on imaging   Her oral contraceptive was discontinued during the last hospitalization  He she is tolerating a low-fat diet without abdominal pain or nausea  Pancreatic enzymes were initiated yesterday          PLAN   Tolerating low-fat diet without abdominal pain, nausea or vomiting  Stable for discharge home with pancreatic enzymes before meals  She will contact me immediately if symptoms recur or go to the ER  She will be seen in consultation by the advanced endoscopist  at [facility]  I provided her contact information him on Friday         Chief Complaint   Patient presents with    Abdominal Pain     pt presents to ED c/o of abdominal pain rates pain 10/10       SUBJECTIVE/HPI   Tolerating low-fat diet without digestive complaints  She feels ready for discharge home        /63 (BP Location: Right arm)   Pulse 96   Temp 98 7 °F (37 1 °C) (Oral)   Resp 18   Ht 5' 3" (1 6 m)   Wt 66 9 kg (147 lb 7 8 oz)   LMP 08/06/2018   SpO2 95%   BMI 26 13 kg/m²     PHYSICALEXAM  General appearance: alert, appears stated age and cooperative  Head: Normocephalic, without obvious abnormality, atraumatic  Lungs: clear to auscultation bilaterally  Heart: regular rate and rhythm, S1, S2 normal, no murmur, click, rub or gallop  Abdomen: soft, non-tender; bowel sounds normal; no masses,  no organomegaly  Extremities: extremities normal, atraumatic, no cyanosis or edema  Neurologic: Grossly normal    Lab Results   Component Value Date    GLUCOSE 94 08/19/2018    CALCIUM 8 6 08/19/2018     08/19/2018    K 3 4 (L) 08/19/2018    CO2 28 08/19/2018     08/19/2018    BUN 6 08/19/2018 CREATININE 0 59 (L) 08/19/2018     Lab Results   Component Value Date    WBC 14 84 (H) 08/19/2018    HGB 10 8 (L) 08/19/2018    HCT 33 3 (L) 08/19/2018    MCV 98 08/19/2018     08/19/2018     Lab Results   Component Value Date    ALT 33 08/16/2018    AST 68 (H) 08/16/2018    ALKPHOS 96 08/16/2018    BILITOT 0 70 08/16/2018     Lab Results   Component Value Date    AMYLASE 580 ([place]) 07/31/2018     Lab Results   Component Value Date    LIPASE 571 (H) 08/19/2018     No results found for: IRON, TIBC, FERRITIN  Lab Results   Component Value Date    INR 1 03 08/09/2018

## 2018-08-19 NOTE — DISCHARGE SUMMARY
Discharge Summary  Loy Terrell 33 y o  female MRN: [id number]  Unit/Bed#: [address] [place] [address] [id number] Encounter: [id number]    Admission Date:   Admission Orders     Ordered        08/16/18 1447  Inpatient Admission (expected length of stay for this patient is greater than two midnights)  Once               Discharge Date: 08/19/18    Admitting Diagnosis: Recurrent acute pancreatitis [K85 90]  Vomiting [R11 10]    HPI:  See history and physical    Procedures Performed: No orders of the defined types were placed in this encounter  Hospital Course:  Patient has a history of recurrent pancreatitis who is the 4th episode since the beginning of July  Patient was treated in usual fashion with IV fluids bowel rest   She had bands tolerating the diet  She was seen by GI  They added pancreatic enzymes on discharge but also felt that the patient should have endoscopic ultrasound  The consulting gastroenterologist will be arranging that to be done at [facility]  Patient was discharged stable condition    Significant Findings, Care, Treatment and Services Provided:  None    Discharge Diagnosis: Principal Problem:    Recurrent acute pancreatitis  Active Problems:    Leukocytosis    Other headache syndrome  Resolved Problems:    Idiopathic acute pancreatitis without infection or necrosis        Condition at Discharge: stable     Discharge instructions/Information to patient and family:   See after visit summary for information provided to patient and family  Provisions for Follow-Up Care:  See after visit summary for information related to follow-up care and any pertinent home health orders  Disposition: Home    Discharge Medications:  See after visit summary for reconciled discharge medications provided to patient and family  This note has been constructed using a voice recognition system         Humble Peters MD

## 2018-08-20 NOTE — PLAN OF CARE

## 2018-08-21 NOTE — CASE MANAGEMENT
Auth:    [id number]    Notification of Discharge  This is a Notification of Discharge from our facility [address]  Please be advised that this patient has been discharge from our facility  Below you will find the admission and discharge date and time including the patients disposition  PRESENTATION DATE: 8/16/2018  1:04 PM  IP ADMISSION DATE: 8/16/18 1447  DISCHARGE DATE: 8/19/2018 12:05 PM  DISPOSITION: [address] [place] [address] in the [facility] Utilization Review Department  Phone: [phone]; Fax [phone]  ATTENTION: The Network Utilization Review Department is now centralized for our 9 Facilities  Make a note that we have a new phone and fax numbers for our Department  Please call with any questions or concerns to [phone] and carefully follow the prompts so that you are directed to the right person  All voicemails are confidential  Fax any determinations, approvals, denials, and requests for initial or continue stay review clinical to [phone]  Due to HIGH CALL volume, it would be easier if you could please send faxed requests to expedite your requests and in part, help us provide discharge notifications faster

## 2018-08-28 ENCOUNTER — TELEPHONE (OUTPATIENT)
Dept: GASTROENTEROLOGY | Facility: CLINIC | Age: 28
End: 2018-08-28

## 2018-08-28 NOTE — TELEPHONE ENCOUNTER
Left message for patient on voicemail to schedule an appointment with Radha Jacobo on 9/24/18 in [place] as per Camila basilio to use the same day

## 2018-09-03 ENCOUNTER — PREP FOR PROCEDURE (OUTPATIENT)
Dept: GASTROENTEROLOGY | Facility: HOSPITAL | Age: 28
End: 2018-09-03

## 2018-09-03 DIAGNOSIS — K85.00 IDIOPATHIC ACUTE PANCREATITIS WITHOUT INFECTION OR NECROSIS: Primary | ICD-10-CM

## 2018-09-06 ENCOUNTER — TELEPHONE (OUTPATIENT)
Dept: GASTROENTEROLOGY | Facility: CLINIC | Age: 28
End: 2018-09-06

## 2018-09-06 PROBLEM — K85.00 IDIOPATHIC ACUTE PANCREATITIS WITHOUT INFECTION OR NECROSIS: Status: ACTIVE | Noted: 2018-09-06

## 2018-09-06 NOTE — TELEPHONE ENCOUNTER
Pt has a new pt appt at [facility] office on 10/15/2018  EUS scheduled with Dr Zelaya in [place] on 10/17/2018  I gave pt verbal instructions/mailed

## 2018-09-06 NOTE — TELEPHONE ENCOUNTER
----- Message from Eileen Archuleta MD sent at 9/3/2018 11:50 PM EDT -----  Regarding: RE: EUS/BUX [id number] [facility] visit  If office visit not possible in a month then let me know I will dsicuss with her over phone  THanks  Carla Lazaro    ----- Message -----  From: Michael Ortiz MA  Sent: 8/28/2018   1:20 PM  To: Eileen Archuleta MD  Subject: EUS/BUX [facility] REFERRAL                            Dr Zelaya,    This is a [facility] referral for Pancreatitis  Is it ok to schedule office visit or schedule procedure? Please Advise  Thanks!

## 2018-09-09 ENCOUNTER — HOSPITAL ENCOUNTER (INPATIENT)
Facility: HOSPITAL | Age: 28
LOS: 1 days | Discharge: HOME | DRG: 440 | End: 2018-09-10
Attending: EMERGENCY MEDICINE | Admitting: INTERNAL MEDICINE
Payer: COMMERCIAL

## 2018-09-09 DIAGNOSIS — K85.90 ACUTE RECURRENT PANCREATITIS: ICD-10-CM

## 2018-09-09 DIAGNOSIS — K85.00 IDIOPATHIC ACUTE PANCREATITIS WITHOUT INFECTION OR NECROSIS: ICD-10-CM

## 2018-09-09 DIAGNOSIS — K85.90 RECURRENT PANCREATITIS: Primary | ICD-10-CM

## 2018-09-09 PROBLEM — R19.7 DIARRHEA IN ADULT PATIENT: Status: ACTIVE | Noted: 2018-09-09

## 2018-09-09 LAB
ALBUMIN SERPL BCP-MCNC: 3.8 G/DL (ref 3.5–5)
ALP SERPL-CCNC: 81 U/L (ref 46–116)
ALT SERPL W P-5'-P-CCNC: 36 U/L (ref 12–78)
ANION GAP SERPL CALCULATED.3IONS-SCNC: 11 MMOL/L (ref 4–13)
APTT PPP: 28 SECONDS (ref 24–36)
AST SERPL W P-5'-P-CCNC: 50 U/L (ref 5–45)
BASOPHILS # BLD AUTO: 0.04 THOUSANDS/ΜL (ref 0–0.1)
BASOPHILS NFR BLD AUTO: 0 % (ref 0–1)
BILIRUB DIRECT SERPL-MCNC: 0.09 MG/DL (ref 0–0.2)
BILIRUB SERPL-MCNC: 0.3 MG/DL (ref 0.2–1)
BUN SERPL-MCNC: 8 MG/DL (ref 5–25)
CALCIUM SERPL-MCNC: 9.4 MG/DL (ref 8.3–10.1)
CHLORIDE SERPL-SCNC: 103 MMOL/L (ref 100–108)
CLARITY, POC: CLEAR
CO2 SERPL-SCNC: 28 MMOL/L (ref 21–32)
COLOR, POC: YELLOW
CREAT SERPL-MCNC: 0.67 MG/DL (ref 0.6–1.3)
EOSINOPHIL # BLD AUTO: 0.45 THOUSAND/ΜL (ref 0–0.61)
EOSINOPHIL NFR BLD AUTO: 3 % (ref 0–6)
ERYTHROCYTE [DISTWIDTH] IN BLOOD BY AUTOMATED COUNT: 12.6 % (ref 11.6–15.1)
EXT BILIRUBIN, UA: NORMAL
EXT BLOOD URINE: NORMAL
EXT GLUCOSE, UA: NORMAL
EXT KETONES: NORMAL
EXT NITRITE, UA: NORMAL
EXT PH, UA: 6
EXT PREG TEST URINE: NEGATIVE
EXT PROTEIN, UA: NORMAL
EXT SPECIFIC GRAVITY, UA: 1.01
EXT UROBILINOGEN: 0.2
GFR SERPL CREATININE-BSD FRML MDRD: 121 ML/MIN/1.73SQ M
GLUCOSE SERPL-MCNC: 104 MG/DL (ref 65–140)
GLUCOSE SERPL-MCNC: 114 MG/DL (ref 65–140)
GLUCOSE SERPL-MCNC: 83 MG/DL (ref 65–140)
HCT VFR BLD AUTO: 39.6 % (ref 34.8–46.1)
HGB BLD-MCNC: 12.9 G/DL (ref 11.5–15.4)
IMM GRANULOCYTES # BLD AUTO: 0.04 THOUSAND/UL (ref 0–0.2)
IMM GRANULOCYTES NFR BLD AUTO: 0 % (ref 0–2)
INR PPP: 0.98 (ref 0.86–1.17)
LIPASE SERPL-CCNC: ABNORMAL U/L (ref 73–393)
LYMPHOCYTES # BLD AUTO: 4.3 THOUSANDS/ΜL (ref 0.6–4.47)
LYMPHOCYTES NFR BLD AUTO: 31 % (ref 14–44)
MCH RBC QN AUTO: 30.8 PG (ref 26.8–34.3)
MCHC RBC AUTO-ENTMCNC: 32.6 G/DL (ref 31.4–37.4)
MCV RBC AUTO: 95 FL (ref 82–98)
MONOCYTES # BLD AUTO: 0.64 THOUSAND/ΜL (ref 0.17–1.22)
MONOCYTES NFR BLD AUTO: 5 % (ref 4–12)
NEUTROPHILS # BLD AUTO: 8.24 THOUSANDS/ΜL (ref 1.85–7.62)
NEUTS SEG NFR BLD AUTO: 61 % (ref 43–75)
NRBC BLD AUTO-RTO: 0 /100 WBCS
PLATELET # BLD AUTO: 347 THOUSANDS/UL (ref 149–390)
PMV BLD AUTO: 11.5 FL (ref 8.9–12.7)
POTASSIUM SERPL-SCNC: 3.4 MMOL/L (ref 3.5–5.3)
PROT SERPL-MCNC: 7.6 G/DL (ref 6.4–8.2)
PROTHROMBIN TIME: 12.4 SECONDS (ref 11.8–14.2)
RBC # BLD AUTO: 4.19 MILLION/UL (ref 3.81–5.12)
SODIUM SERPL-SCNC: 142 MMOL/L (ref 136–145)
TRIGL SERPL-MCNC: 110 MG/DL
WBC # BLD AUTO: 13.71 THOUSAND/UL (ref 4.31–10.16)
WBC # BLD EST: NORMAL 10*3/UL

## 2018-09-09 PROCEDURE — 96374 THER/PROPH/DIAG INJ IV PUSH: CPT

## 2018-09-09 PROCEDURE — 84478 ASSAY OF TRIGLYCERIDES: CPT | Performed by: NURSE PRACTITIONER

## 2018-09-09 PROCEDURE — 80048 BASIC METABOLIC PNL TOTAL CA: CPT | Performed by: EMERGENCY MEDICINE

## 2018-09-09 PROCEDURE — 81025 URINE PREGNANCY TEST: CPT | Performed by: EMERGENCY MEDICINE

## 2018-09-09 PROCEDURE — 96361 HYDRATE IV INFUSION ADD-ON: CPT

## 2018-09-09 PROCEDURE — 99284 EMERGENCY DEPT VISIT MOD MDM: CPT

## 2018-09-09 PROCEDURE — 85730 THROMBOPLASTIN TIME PARTIAL: CPT | Performed by: EMERGENCY MEDICINE

## 2018-09-09 PROCEDURE — 85610 PROTHROMBIN TIME: CPT | Performed by: EMERGENCY MEDICINE

## 2018-09-09 PROCEDURE — C9113 INJ PANTOPRAZOLE SODIUM, VIA: HCPCS | Performed by: EMERGENCY MEDICINE

## 2018-09-09 PROCEDURE — 80076 HEPATIC FUNCTION PANEL: CPT | Performed by: EMERGENCY MEDICINE

## 2018-09-09 PROCEDURE — 36415 COLL VENOUS BLD VENIPUNCTURE: CPT | Performed by: EMERGENCY MEDICINE

## 2018-09-09 PROCEDURE — 99223 1ST HOSP IP/OBS HIGH 75: CPT | Performed by: HOSPITALIST

## 2018-09-09 PROCEDURE — 85025 COMPLETE CBC W/AUTO DIFF WBC: CPT | Performed by: EMERGENCY MEDICINE

## 2018-09-09 PROCEDURE — 81002 URINALYSIS NONAUTO W/O SCOPE: CPT | Performed by: EMERGENCY MEDICINE

## 2018-09-09 PROCEDURE — 83690 ASSAY OF LIPASE: CPT | Performed by: EMERGENCY MEDICINE

## 2018-09-09 PROCEDURE — 82948 REAGENT STRIP/BLOOD GLUCOSE: CPT

## 2018-09-09 RX ORDER — PANTOPRAZOLE SODIUM 40 MG/1
40 INJECTION, POWDER, FOR SOLUTION INTRAVENOUS ONCE
Status: COMPLETED | OUTPATIENT
Start: 2018-09-09 | End: 2018-09-09

## 2018-09-09 RX ORDER — ONDANSETRON 2 MG/ML
4 INJECTION INTRAMUSCULAR; INTRAVENOUS ONCE
Status: COMPLETED | OUTPATIENT
Start: 2018-09-09 | End: 2018-09-09

## 2018-09-09 RX ORDER — POTASSIUM CHLORIDE 20 MEQ/1
40 TABLET, EXTENDED RELEASE ORAL ONCE
Status: COMPLETED | OUTPATIENT
Start: 2018-09-09 | End: 2018-09-09

## 2018-09-09 RX ORDER — ACETAMINOPHEN 325 MG/1
650 TABLET ORAL EVERY 6 HOURS PRN
Status: DISCONTINUED | OUTPATIENT
Start: 2018-09-09 | End: 2018-09-10 | Stop reason: HOSPADM

## 2018-09-09 RX ORDER — DOCUSATE SODIUM 100 MG/1
100 CAPSULE, LIQUID FILLED ORAL DAILY
Status: DISCONTINUED | OUTPATIENT
Start: 2018-09-09 | End: 2018-09-09

## 2018-09-09 RX ORDER — ONDANSETRON 2 MG/ML
4 INJECTION INTRAMUSCULAR; INTRAVENOUS EVERY 6 HOURS PRN
Status: DISCONTINUED | OUTPATIENT
Start: 2018-09-09 | End: 2018-09-10 | Stop reason: HOSPADM

## 2018-09-09 RX ORDER — SODIUM CHLORIDE 9 MG/ML
250 INJECTION, SOLUTION INTRAVENOUS CONTINUOUS
Status: DISCONTINUED | OUTPATIENT
Start: 2018-09-09 | End: 2018-09-10

## 2018-09-09 RX ORDER — SODIUM CHLORIDE 9 MG/ML
125 INJECTION, SOLUTION INTRAVENOUS CONTINUOUS
Status: DISCONTINUED | OUTPATIENT
Start: 2018-09-09 | End: 2018-09-09

## 2018-09-09 RX ADMIN — ACETAMINOPHEN 650 MG: 325 TABLET, FILM COATED ORAL at 20:23

## 2018-09-09 RX ADMIN — HYDROMORPHONE HYDROCHLORIDE 1 MG: 1 INJECTION, SOLUTION INTRAMUSCULAR; INTRAVENOUS; SUBCUTANEOUS at 08:55

## 2018-09-09 RX ADMIN — ONDANSETRON 4 MG: 2 INJECTION, SOLUTION INTRAMUSCULAR; INTRAVENOUS at 02:38

## 2018-09-09 RX ADMIN — POTASSIUM CHLORIDE 40 MEQ: 1500 TABLET, EXTENDED RELEASE ORAL at 11:05

## 2018-09-09 RX ADMIN — HYDROMORPHONE HYDROCHLORIDE 1 MG: 1 INJECTION, SOLUTION INTRAMUSCULAR; INTRAVENOUS; SUBCUTANEOUS at 04:30

## 2018-09-09 RX ADMIN — ONDANSETRON 4 MG: 2 INJECTION, SOLUTION INTRAMUSCULAR; INTRAVENOUS at 11:05

## 2018-09-09 RX ADMIN — SODIUM CHLORIDE 125 ML/HR: 0.9 INJECTION, SOLUTION INTRAVENOUS at 02:35

## 2018-09-09 RX ADMIN — SODIUM CHLORIDE 250 ML/HR: 0.9 INJECTION, SOLUTION INTRAVENOUS at 21:31

## 2018-09-09 RX ADMIN — SODIUM CHLORIDE 250 ML/HR: 0.9 INJECTION, SOLUTION INTRAVENOUS at 13:28

## 2018-09-09 RX ADMIN — PANTOPRAZOLE SODIUM 40 MG: 40 INJECTION, POWDER, FOR SOLUTION INTRAVENOUS at 03:40

## 2018-09-09 RX ADMIN — SODIUM CHLORIDE 250 ML/HR: 0.9 INJECTION, SOLUTION INTRAVENOUS at 08:54

## 2018-09-09 RX ADMIN — SODIUM CHLORIDE 250 ML/HR: 0.9 INJECTION, SOLUTION INTRAVENOUS at 04:30

## 2018-09-09 NOTE — ASSESSMENT & PLAN NOTE
This is patient's 5th admission since June for pancreatitis  Lipase 17,698  Will keep patient NPO  IV fluids for hydration  Dilaudid 1 mg IV q 3 hours p r n  pain  Inpatient consult to GI  Patient is scheduled for linear endoscopic ultrasound on 10/17/2018 with Dr Jeffrey Costa at [place]

## 2018-09-09 NOTE — ED NOTES
Pt requesting Dilaudid  Provider made aware pt requesting pain medications       Gertrude Bond RN  09/09/18 4035

## 2018-09-09 NOTE — H&P
H&P- Elen Majano 1990, 33 y o  female MRN: [id number]    Unit/Bed#: [address] [place] [address] Encounter: [id number]    Primary Care Provider: Dillon Bui DO   Date and time admitted to hospital: 9/9/2018  2:14 AM        * Acute recurrent pancreatitis   Assessment & Plan    This is patient's 5th admission since June for pancreatitis  Lipase 17,698  Will keep patient NPO  IV fluids for hydration  Dilaudid 1 mg IV q 3 hours p r n  pain  Inpatient consult to GI  Patient is scheduled for linear endoscopic ultrasound on 10/17/2018 with Dr Prasanna Hammer at [place]  Leukocytosis   Assessment & Plan    WBCs 13 71 in ED  Most likely due pancreatitis  Patient is afebrile  Will hold off on antibiotics for now  Will repeat CBC in a [facility] Diarrhea in adult patient   Assessment & Plan    Patient had 3-4 episodes of diarrhea since last evening  Will place patient on contact precautions with hand hygiene  Obtain C diff specimen along with stool culture  If C diff negative can DC contact precautions and administer anti diarrheal           VTE Prophylaxis: Pt is low risk for VTE  / sequential compression device   Code Status: Full code  POLST: There is no POLST form on file for this patient (pre-hospital)  Discussion with family: No family present during admission    Anticipated Length of Stay:  Patient will be admitted on an Inpatient basis with an anticipated length of stay of  > 2 midnights  Justification for Hospital Stay: IV medication    Total Time for Visit, including Counseling / Coordination of Care: 30 minutes  Greater than 50% of this total time spent on direct patient counseling and coordination of care  Chief Complaint:   Abdominal pain    History of Present Illness:    Elen Majano is a 33 y o  female with history of recurrent idiopathic pancreatitis who presents with c/o abdominal pain  Pt states that around 11:30 last evening she developed mild epigastric pain and nausea   The pain gradually became worse and then pt had 3 episodes of diarrhea  Pt describes the pain as sharp 7/10 mid epigastric pain that is worse with palpation  Pt is concerned that symptoms developed because she ate more today than she has been eating and she drank 1 alcoholic beverage while out for dinner  Pt has been hospitalized 4 times prior to this admission for pancreatitis  Her birth control and Topamax were thought to be possible cause  She was started on Creon and scheduled for endoscopic ultrasound on October 17 at [facility]  Pt denies fever or chills  Pt had 1 episode of vomiting while in ED  Review of Systems:    Review of Systems   Constitutional: Positive for activity change and appetite change  Negative for chills and fever  Respiratory: Negative for apnea, cough and shortness of breath  Cardiovascular: Negative for chest pain, palpitations and leg swelling  Gastrointestinal: Positive for abdominal pain, diarrhea, nausea and vomiting  Negative for abdominal distention and constipation  Genitourinary: Negative for dysuria  Neurological: Negative for seizures, facial asymmetry, weakness, light-headedness, numbness and headaches  Psychiatric/Behavioral: Negative for agitation and confusion  The patient is not nervous/anxious  All other systems reviewed and are negative  Past Medical and Surgical History:     Past Medical History:   Diagnosis Date    Pancreatitis 2015       Past Surgical History:   Procedure Laterality Date    TONSILLECTOMY         Meds/Allergies:    Prior to Admission medications    Medication Sig Start Date End Date Taking? Authorizing Provider   pancrelipase, Lip-Prot-Amyl, (CREON) 24,000 units Take 1 capsule (24,000 Units total) by mouth 3 (three) times a day with meals 8/19/18   Myah Sidhu MD     I have reviewed home medications with patient personally      Allergies: No Known Allergies    Social History:     Marital Status: Single   Occupation: Works at [address] [place] [address] [employer]  Patient Pre-hospital Living Situation: Lives with teresa  Patient Pre-hospital Level of Mobility: Independent  Patient Pre-hospital Diet Restrictions: None  Substance Use History:   History   Alcohol Use    Yes     Comment: rarely     History   Smoking Status    Never Smoker   Smokeless Tobacco    Never Used     History   Drug Use No       Family History:    non-contributory    Physical Exam:     Vitals:   Blood Pressure: 150/100 (09/09/18 0424)  Pulse: 95 (09/09/18 0424)  Temperature: 98 °F (36 7 °C) (09/09/18 0424)  Temp Source: Oral (09/09/18 0424)  Respirations: 18 (09/09/18 0424)  Height: 5' 3" (160 cm) (09/09/18 0424)  Weight - Scale: 67 kg (147 lb 11 3 oz) (09/09/18 0424)  SpO2: 99 % (09/09/18 0424)    Physical Exam   Constitutional: She is oriented to person, place, and time  Vital signs are normal  She appears well-developed and well-nourished  She is cooperative  No distress  HENT:   Head: Normocephalic and atraumatic  Eyes: EOM are normal  Pupils are equal, round, and reactive to light  Neck: Normal range of motion  Neck supple  Cardiovascular: Normal rate, regular rhythm, normal heart sounds and intact distal pulses  Exam reveals no gallop and no friction rub  No murmur heard  Pulmonary/Chest: Effort normal and breath sounds normal  No respiratory distress  She has no wheezes  She has no rales  Abdominal: Soft  Bowel sounds are normal  She exhibits no distension  There is tenderness in the epigastric area  There is guarding  There is no rigidity and no rebound  Musculoskeletal: Normal range of motion  She exhibits no edema  Neurological: She is alert and oriented to person, place, and time  Skin: Skin is warm and dry  Psychiatric: She has a normal mood and affect  Judgment normal    Nursing note and vitals reviewed  Additional Data:     Lab Results: I have personally reviewed pertinent reports          Results from last 7 days  Lab Units 09/09/18  5206 WBC Thousand/uL 13 71*   HEMOGLOBIN g/dL 12 9   HEMATOCRIT % 39 6   PLATELETS Thousands/uL 347   NEUTROS PCT % 61   LYMPHS PCT % 31   MONOS PCT % 5   EOS PCT % 3       Results from last 7 days  Lab Units 09/09/18  0235   SODIUM mmol/L 142   POTASSIUM mmol/L 3 4*   CHLORIDE mmol/L 103   CO2 mmol/L 28   BUN mg/dL 8   CREATININE mg/dL 0 67   CALCIUM mg/dL 9 4   ALK PHOS U/L 81   ALT U/L 36   AST U/L 50*       Results from last 7 days  Lab Units 09/09/18  0235   INR  0 98               Imaging: No images noted    No orders to display         Allscripts / Epic Records Reviewed: Yes     ** Please Note: This note has been constructed using a voice recognition system   **

## 2018-09-09 NOTE — ED NOTES
Provider at bedside  Pt vomited once here  Pt reports feeling some relief in pain after vomiting  Pt reports 3 episodes of diarrhea since 11:30 pm yesterday       Perry Pires RN  09/09/18 1619

## 2018-09-09 NOTE — CASE MANAGEMENT
Thank you,  [address]  Utilization Review Department  Phone: [phone]; Fax [phone]  ATTENTION: Please call with any questions or concerns to [phone]  and carefully follow the prompts so that you are directed to the right person  Send all requests for admission clinical reviews, approved or denied determinations and any other requests to fax [phone]  All voicemails are confidential      =============================================================================    Initial Clinical Review    Admission: Date/Time/Statement: 9/9/18 @ 0329  Orders Placed This Encounter   Procedures    Inpatient Admission (expected length of stay for this patient is greater than two midnights)     Standing Status:   Standing     Number of Occurrences:   1     Order Specific Question:   Admitting Physician     Answer:   Charlee Brunner [[id number]]     Order Specific Question:   Level of Care     Answer:   Med Surg [[id number]]     Order Specific Question:   Estimated length of stay     Answer:   More than 2 Midnights     Order Specific Question:   Certification     Answer:   I certify that inpatient services are medically necessary for this patient for a duration of greater than two midnights  See H&P and MD Progress Notes for additional information about the patient's course of treatment  ED: Date/Time/Mode of Arrival:   ED Arrival Information     Expected Arrival Acuity Means of Arrival Escorted By Service Admission Type    - 9/9/2018 01:51 Urgent Walk-In [facility] Urgent    Arrival Complaint    Abdominal Pain          Chief Complaint:   Chief Complaint   Patient presents with    Abdominal Pain     Pt presents to ED reporting upper, epigastric abdominal pain  Pain started at 11:30 PM yesterday  Pt reports pain at 8  Pt reports N/D          History of Illness:   Maryjane Méndez is a 33 y o  female with history of recurrent idiopathic pancreatitis who presents with c/o abdominal pain  Pt states that around 11:30 last evening she developed mild epigastric pain and nausea  The pain gradually became worse and then pt had 3 episodes of diarrhea  Pt describes the pain as sharp 7/10 mid epigastric pain that is worse with palpation  Pt is concerned that symptoms developed because she ate more today than she has been eating and she drank 1 alcoholic beverage while out for dinner  Pt has been hospitalized 4 times prior to this admission for pancreatitis  Her birth control and Topamax were thought to be possible cause  She was started on Creon and scheduled for endoscopic ultrasound on October 17 at [facility]  Pt denies fever or chills  Pt had 1 episode of vomiting while in ED       ED Vital Signs:   ED Triage Vitals   Temperature Pulse Respirations Blood Pressure SpO2   09/09/18 0219 09/09/18 0219 09/09/18 0219 09/09/18 0219 09/09/18 0245   97 6 °F (36 4 °C) 75 18 119/75 99 %      Temp Source Heart Rate Source Patient Position - Orthostatic VS BP Location FiO2 (%)   09/09/18 0219 09/09/18 0219 09/09/18 0219 09/09/18 0219 --   Temporal Monitor Sitting Right arm       Pain Score       09/09/18 0217       8        Wt Readings from Last 1 Encounters:   09/09/18 67 kg (147 lb 11 3 oz)     Abnormal Labs/Diagnostic Test Results:   WBC Thousand/uL 13 71*   HEMOGLOBIN g/dL 12 9   HEMATOCRIT % 39 6   PLATELETS Thousands/uL 347     SODIUM mmol/L 142   POTASSIUM mmol/L 3 4*   CHLORIDE mmol/L 103   CO2 mmol/L 28   BUN mg/dL 8   CREATININE mg/dL 0 67   CALCIUM mg/dL 9 4   ALK PHOS U/L 81   ALT U/L 36   AST U/L 50*     Color, UA Yellow     Clarity, UA Clear    EXT Glucose, UA (Ref: Negative) Neg    EXT Bilirubin, UA (Ref: Negative) Neg    Ketones, UA (Ref: Negative) Neg    EXT Spec Grav, UA (Ref:1 003-1 030) 1 010    Blood, UA (Ref: Negative) Trace    EXT pH, UA (Ref: 4 5-8 0) 6 0    EXT Protein, UA (Ref: Negative) Neg    Urobilinogen, UA (Ref: 0 2- 1 0) 0 2     Leukocytes, UA (Ref: Negative) Neg    Nitrite, UA (Ref: Negative) Neg      Lipase 17,698 (H) 73 - 393 u/L     ED Treatment:   Medication Administration from 09/09/2018 0151 to 09/09/2018 0413    Date/Time Order Dose Route Action   09/09/2018 0235 sodium chloride 0 9 % infusion 125 mL/hr Intravenous Given   09/09/2018 0238 ondansetron (ZOFRAN) injection 4 mg 4 mg Intravenous Given   09/09/2018 0340 pantoprazole (PROTONIX) injection 40 mg 40 mg Intravenous Given          Past Medical/Surgical History: Active Ambulatory Problems     Diagnosis Date Noted    Acute recurrent pancreatitis 07/30/2018    Hyperglycemia 08/09/2018    Leukocytosis 08/17/2018    Other headache syndrome 08/17/2018     Resolved Ambulatory Problems     Diagnosis Date Noted    Idiopathic acute pancreatitis without infection or necrosis 06/02/2018    Hypokalemia 06/02/2018    Idiopathic acute pancreatitis without infection or necrosis 09/06/2018     Past Medical History:   Diagnosis Date    Pancreatitis 2015       Admitting Diagnosis: Recurrent pancreatitis ([facility] [address] ) [K86 1]  Abdominal pain, acute [R10 9]  Idiopathic acute pancreatitis without infection or necrosis [K85 00]    Age/Sex: 33 y o  female    Assessment/Plan:   * Acute recurrent pancreatitis   Assessment & Plan     This is patient's 5th admission since June for pancreatitis  Lipase 17,698  Will keep patient NPO  IV fluids for hydration  Dilaudid 1 mg IV q 3 hours p r n  pain  Inpatient consult to GI  Patient is scheduled for linear endoscopic ultrasound on 10/17/2018 with Dr Mary Sarmiento at [place]           Leukocytosis   Assessment & Plan     WBCs 13 71 in ED  Most likely due pancreatitis  Patient is afebrile  Will hold off on antibiotics for now  Will repeat CBC in a m             Diarrhea in adult patient   Assessment & Plan     Patient had 3-4 episodes of diarrhea since last evening  Will place patient on contact precautions with hand hygiene  Obtain C diff specimen along with stool culture    If C diff negative can DC contact precautions and administer anti diarrheal              VTE Prophylaxis: Pt is low risk for VTE  / sequential compression device   Anticipated Length of Stay:  Patient will be admitted on an Inpatient basis with an anticipated length of stay of  > 2 midnights     Justification for Hospital Stay: IV medication  Admission Orders:  Scheduled Meds:   Current Facility-Administered Medications:  HYDROmorphone 1 mg Intravenous Q3H PRN    ondansetron 4 mg Intravenous Q6H PRN    sodium chloride 250 mL/hr Intravenous Continuous Last Rate: 250 mL/hr (09/09/18 1328)     Continuous Infusions:   sodium chloride 250 mL/hr Last Rate: 250 mL/hr (09/09/18 1328)     NPO  Up as tolerated  Consult GI

## 2018-09-09 NOTE — ASSESSMENT & PLAN NOTE
Patient had 3-4 episodes of diarrhea since last evening  Will place patient on contact precautions with hand hygiene  Obtain C diff specimen along with stool culture    If C diff negative can DC contact precautions and administer anti diarrheal

## 2018-09-09 NOTE — CONSULTS
Consultation - GI   Maryjane Méndez 33 y o  female MRN: [id number]  Unit/Bed#: [address] [place], [place] [address] Encounter: [id number]    Consults  ASSESSMENT  28F well known to me with a history of recurrent acute idiopathic pancreatitis  This represents her 5th admission since June  Workup thus far includes negative MRCP, normal triglycerides, normal calcium, normal IgG 4 level  Her OCP ([facility]) was stopped and not resumed  Imaging negative for gallstones  No tobacco use  No heavy alcohol use  She was started on Creon at her last hospitalization and felt well for 3 weeks  She ate a large amount of food yesterday and had her 1st alcoholic beverage, then developed abdominal pain, loose stool, then vomiting  Symptoms are rapidly improving with bowel rest and IV fluids    PLAN    Agree with admission for supportive care including bowel rest, analgesia and IV fluids  Will start a clear liquid diet as her appetite is returning    As her diet is advanced we will increase pancreatic enzyme dose  Advised total cessation of alcohol in tail in etiology is determined    Endoscopic ultrasound is planned in 1 month  I will communicate with Dr Dayana Dudley to inquire if this can be expedited, but ideally we will avoid EUS during an acute flare to allow the most precise/accurate imaging  If no anatomic etiology is determined she may benefit from genetic testing (PRSS, SPINK etc)     Chief Complaint   Patient presents with    Abdominal Pain     Pt presents to ED reporting upper, epigastric abdominal pain  Pain started at 11:30 PM yesterday  Pt reports pain at 8  Pt reports N/D  HPI   Patient is a 56-year-old female well known to me from multiple hospitalizations at [facility] with acute pancreatitis  Her 1st episode is in June and this is her 5th admission since then  No etiology has been determined thus far  She stopped her oral contraceptive pill as was a potential etiology, but she has to flare since then    Her symptoms were well controlled since her last admission since starting Creon  Yesterday she awoke in her usual state of health  She had attended a baby shower and had a large amount of food, much more than on a typical day for her  She returned home then went to [facility] and had an alcoholic beverage, her 1st since hospital discharge  That night she developed abdominal pain typical of her pancreatitis  She then had a few loose stools at home  She presented to the ER and had an episode of vomiting, she thinks this may have been after pain medications  She has had no diarrhea since the admission to the floor  Her pain is very well controlled; with her last dose of pain medication about 6 hr ago  She feels hungry and would like to start a liquid diet      Past Medical History:   Diagnosis Date    Pancreatitis 2015       Past Surgical History:   Procedure Laterality Date    TONSILLECTOMY         Prescriptions Prior to Admission   Medication    pancrelipase, Lip-Prot-Amyl, (CREON) 24,000 units       No Known Allergies    Social History     Family History   Problem Relation Age of Onset    Hypertension Father        8 Point Review of Systems - Negative except abdominal pain, nausea, vomiting    /61 (BP Location: Right arm)   Pulse 75   Temp 98 °F (36 7 °C) (Oral)   Resp 18   Ht 5' 3" (1 6 m)   Wt 67 kg (147 lb 11 3 oz)   SpO2 98%   BMI 26 17 kg/m²     PHYSICALEXAM  General appearance: alert, appears stated age and cooperative  Head: Normocephalic, without obvious abnormality, atraumatic  Lungs: clear to auscultation bilaterally  Heart: regular rate and rhythm, S1, S2 normal, no murmur, click, rub or gallop  Abdomen: soft, mild upper abdominal tenderness; bowel sounds normal; no masses,  no organomegaly  Extremities: extremities normal, atraumatic, no cyanosis or edema  Neurologic: Grossly normal    Lab Results   Component Value Date    CALCIUM 9 4 09/09/2018     09/09/2018    K 3 4 (L) 09/09/2018    CO2 28 09/09/2018     09/09/2018    BUN 8 09/09/2018    CREATININE 0 67 09/09/2018     Lab Results   Component Value Date    WBC 13 71 (H) 09/09/2018    HGB 12 9 09/09/2018    HCT 39 6 09/09/2018    MCV 95 09/09/2018     09/09/2018     Lab Results   Component Value Date    ALT 36 09/09/2018    AST 50 (H) 09/09/2018    ALKPHOS 81 09/09/2018     Lab Results   Component Value Date    AMYLASE 580 (HH) 07/31/2018     Lab Results   Component Value Date    LIPASE 17,698 (H) 09/09/2018     No results found for: IRON, TIBC, FERRITIN  Lab Results   Component Value Date    INR 0 98 09/09/2018

## 2018-09-09 NOTE — ED NOTES
Pt pleasant upon approach  Pt denies nausea at this time  Pt made aware of admission status       Adri Lundy RN  09/09/18 5731

## 2018-09-09 NOTE — ASSESSMENT & PLAN NOTE
WBCs 13 71 in ED  Most likely due pancreatitis  Patient is afebrile  Will hold off on antibiotics for now  Will repeat CBC in a prasanna [facility]

## 2018-09-09 NOTE — ED PROVIDER NOTES
History  Chief Complaint   Patient presents with    Abdominal Pain     Pt presents to ED reporting upper, epigastric abdominal pain  Pain started at 11:30 PM yesterday  Pt reports pain at 8  Pt reports N/D  This 60-year-old female with history of idiopathic recurrent pancreatitis complains of nausea and epigastric pain since 11:30 p m  Tasha Logan She has had diarrhea 3 or 4 times since then  She vomited once just now  This was nonbloody and was filled with food  Her boyfriend states that she ate a large meal tonight and did have 1 drink of alcohol  She had otherwise been doing well after her most recent hospitalization for pancreatitis a month or two ago  She had been placed on Creon at that time  She stopped oral birth control pills and has not restarted them  The patient denies chest pain, cough, dysuria  She states her last menstrual period was 1 month ago but does not believe she is pregnant  Prior to Admission Medications   Prescriptions Last Dose Informant Patient Reported? Taking? pancrelipase, Lip-Prot-Amyl, (CREON) 24,000 units   No No   Sig: Take 1 capsule (24,000 Units total) by mouth 3 (three) times a day with meals      Facility-Administered Medications: None       Past Medical History:   Diagnosis Date    Pancreatitis 2015       Past Surgical History:   Procedure Laterality Date    TONSILLECTOMY         Family History   Problem Relation Age of Onset    Hypertension Father      I have reviewed and agree with the history as documented  Social History   Substance Use Topics    Smoking status: Never Smoker    Smokeless tobacco: Never Used    Alcohol use Yes      Comment: rarely        Review of Systems   Constitutional: Negative  HENT: Negative  Eyes: Negative  Respiratory: Negative  Cardiovascular: Negative  Gastrointestinal: Negative  See HPI   Endocrine: Negative  Genitourinary: Negative  Musculoskeletal: Negative  Skin: Negative  Allergic/Immunologic: Negative  Neurological: Negative  Hematological: Negative  Psychiatric/Behavioral: Negative  All other systems reviewed and are negative  Physical Exam  Physical Exam   Constitutional: She is oriented to person, place, and time  She appears well-developed and well-nourished  HENT:   Head: Normocephalic and atraumatic  Mouth/Throat: Oropharynx is clear and moist    Eyes: Conjunctivae and EOM are normal  Pupils are equal, round, and reactive to light  Neck: Normal range of motion  Neck supple  No JVD present  Cardiovascular: Normal rate and regular rhythm  No murmur heard  Pulmonary/Chest: Effort normal and breath sounds normal    Abdominal: Soft  Bowel sounds are normal  She exhibits no distension and no mass  There is tenderness (  Epigastric and both upper quadrants)  There is no rebound and no guarding  No hernia  Musculoskeletal: Normal range of motion  She exhibits no edema  Neurological: She is alert and oriented to person, place, and time  She has normal reflexes  No cranial nerve deficit  Coordination normal    Skin: Skin is warm and dry  Capillary refill takes less than 2 seconds  No rash noted  Psychiatric: She has a normal mood and affect  Nursing note and vitals reviewed        Vital Signs  ED Triage Vitals   Temperature Pulse Respirations Blood Pressure SpO2   09/09/18 0219 09/09/18 0219 09/09/18 0219 09/09/18 0219 09/09/18 0245   97 6 °F (36 4 °C) 75 18 119/75 99 %      Temp Source Heart Rate Source Patient Position - Orthostatic VS BP Location FiO2 (%)   09/09/18 0219 09/09/18 0219 09/09/18 0219 09/09/18 0219 --   Temporal Monitor Sitting Right arm       Pain Score       09/09/18 0217       8           Vitals:    09/09/18 1525 09/09/18 2251 09/10/18 0733 09/10/18 1505   BP: 106/61 107/58 128/63 100/59   Pulse: 75 77 82 90   Patient Position - Orthostatic VS: Lying Lying Lying Lying       Visual Acuity  Visual Acuity      Most Recent Value   L Pupil Size (mm)  3   R Pupil Size (mm)  3          ED Medications  Medications   ondansetron (ZOFRAN) injection 4 mg (4 mg Intravenous Given 9/9/18 0238)   pantoprazole (PROTONIX) injection 40 mg (40 mg Intravenous Given 9/9/18 0340)   potassium chloride (K-DUR,KLOR-CON) CR tablet 40 mEq (40 mEq Oral Given 9/9/18 1105)       Diagnostic Studies  Results Reviewed     Procedure Component Value Units Date/Time    Lipase [[id number]]  (Abnormal) Collected:  09/09/18 0235    Lab Status:  Final result Specimen:  Blood from Arm, Left Updated:  09/09/18 0344     Lipase 17,698 (H) u/L     Basic metabolic panel [[id number]]  (Abnormal) Collected:  09/09/18 0235    Lab Status:  Final result Specimen:  Blood from Arm, Left Updated:  09/09/18 0319     Sodium 142 mmol/L      Potassium 3 4 (L) mmol/L      Chloride 103 mmol/L      CO2 28 mmol/L      ANION GAP 11 mmol/L      BUN 8 mg/dL      Creatinine 0 67 mg/dL      Glucose 114 mg/dL      Calcium 9 4 mg/dL      eGFR 121 ml/min/1 73sq m     Narrative:         National Kidney Disease Education Program recommendations are as follows:  GFR calculation is accurate only with a steady state creatinine  Chronic Kidney disease less than 60 ml/min/1 73 sq  meters  Kidney failure less than 15 ml/min/1 73 sq  meters      Hepatic function panel [[id number]]  (Abnormal) Collected:  09/09/18 0235    Lab Status:  Final result Specimen:  Blood from Arm, Left Updated:  09/09/18 0319     Total Bilirubin 0 30 mg/dL      Bilirubin, Direct 0 09 mg/dL      Alkaline Phosphatase 81 U/L      AST 50 (H) U/L      ALT 36 U/L      Total Protein 7 6 g/dL      Albumin 3 8 g/dL     POCT urinalysis dipstick [[id number]]  (Normal) Resulted:  09/09/18 0303    Lab Status:  Final result Specimen:  Urine Updated:  09/09/18 0304     Color, UA Yellow     Clarity, UA Clear     EXT Glucose, UA (Ref: Negative) Neg     EXT Bilirubin, UA (Ref: Negative) Neg     Ketones, UA (Ref: Negative) Neg     EXT Spec Grav, UA (Ref:1 003-1 030) 1 010     Blood, UA (Ref: Negative) Trace     EXT pH, UA (Ref: 4 5-8 0) 6 0     EXT Protein, UA (Ref: Negative) Neg     Urobilinogen, UA (Ref: 0 2- 1 0) 0 2      Leukocytes, UA (Ref: Negative) Neg     Nitrite, UA (Ref: Negative) Neg    POCT pregnancy, urine [[id number]]  (Normal) Resulted:  09/09/18 0303    Lab Status:  Final result Updated:  09/09/18 0303     EXT PREG TEST UR (Ref: Negative) Negative    Protime-INR [[id number]]  (Normal) Collected:  09/09/18 0235    Lab Status:  Final result Specimen:  Blood from Arm, Left Updated:  09/09/18 0302     Protime 12 4 seconds      INR 0 98    APTT [[id number]]  (Normal) Collected:  09/09/18 0235    Lab Status:  Final result Specimen:  Blood from Arm, Left Updated:  09/09/18 0302     PTT 28 seconds     CBC and differential [[id number]]  (Abnormal) Collected:  09/09/18 0235    Lab Status:  Final result Specimen:  Blood from Arm, Left Updated:  09/09/18 0250     WBC 13 71 (H) Thousand/uL      RBC 4 19 Million/uL      Hemoglobin 12 9 g/dL      Hematocrit 39 6 %      MCV 95 fL      MCH 30 8 pg      MCHC 32 6 g/dL      RDW 12 6 %      MPV 11 5 fL      Platelets 161 Thousands/uL      nRBC 0 /100 WBCs      Neutrophils Relative 61 %      Immat GRANS % 0 %      Lymphocytes Relative 31 %      Monocytes Relative 5 %      Eosinophils Relative 3 %      Basophils Relative 0 %      Neutrophils Absolute 8 24 (H) Thousands/µL      Immature Grans Absolute 0 04 Thousand/uL      Lymphocytes Absolute 4 30 Thousands/µL      Monocytes Absolute 0 64 Thousand/µL      Eosinophils Absolute 0 45 Thousand/µL      Basophils Absolute 0 04 Thousands/µL                  No orders to display              Procedures  Procedures       Phone Contacts  ED Phone Contact    ED Course  ED Course as of Sep 12 2041   Dany Gordon Sep 09, 2018   0311  1143 patient's lipase is greater than 10,000 and it will be diluted for more accurate results                                MDM  Number of Diagnoses or Management Options  Recurrent pancreatitis [facility]): established and worsening     Amount and/or Complexity of Data Reviewed  Clinical lab tests: ordered and reviewed  Obtain history from someone other than the patient: yes  Review and summarize past medical records: yes  Discuss the patient with other providers: yes      CritCare Time    Disposition  Final diagnoses:   Recurrent pancreatitis ([facility] [address] )     Time reflects when diagnosis was documented in both MDM as applicable and the Disposition within this note     Time User Action Codes Description Comment    9/9/2018  3:28 AM Lenleonelt Diver Add [K86 1] Recurrent pancreatitis ([facility] [address] )     9/9/2018  3:36 AM Marta Meyers Add [K85 00] Idiopathic acute pancreatitis without infection or necrosis     9/9/2018  3:36 AM Marta Meyers Modify [K85 00] Idiopathic acute pancreatitis without infection or necrosis     9/10/2018  3:23 PM Jennifer Brewer Add [K85 90] Acute recurrent pancreatitis       ED Disposition     ED Disposition Condition Comment    Admit  Case was discussed with  Night practitioner and the patient's admission status was agreed to be Admission Status: inpatient status to the service of Dr Caryn Miller   Follow-up Information     Follow up With Specialties Details Why DO [facility] Schedule an appointment as soon as possible for a visit in 1 week(s)  [address] [facility],[address]  [address] [place] [address]  [phone]            Discharge Medication List as of 9/10/2018  4:07 PM      CONTINUE these medications which have CHANGED    Details   pancrelipase, Lip-Prot-Amyl, (CREON) 24,000 units Take 3 capsules (72,000 Units total) by mouth 3 (three) times a day with meals, Starting Mon 9/10/2018, Normal             Outpatient Discharge Orders  Ambulatory referral to Gastroenterology   Standing Status: Future  Standing Exp   Date: 03/10/19     Discharge Diet     Activity as tolerated         ED Provider  Electronically Signed by           Royal Hines [facility], DO  09/09/18 [address] [facility],   09/12/18 5867

## 2018-09-10 VITALS
HEIGHT: 63 IN | RESPIRATION RATE: 18 BRPM | BODY MASS INDEX: 26.76 KG/M2 | SYSTOLIC BLOOD PRESSURE: 100 MMHG | TEMPERATURE: 98.3 F | DIASTOLIC BLOOD PRESSURE: 59 MMHG | OXYGEN SATURATION: 99 % | HEART RATE: 90 BPM | WEIGHT: 151.01 LBS

## 2018-09-10 LAB
ALBUMIN SERPL BCP-MCNC: 2.5 G/DL (ref 3.5–5)
ALP SERPL-CCNC: 65 U/L (ref 46–116)
ALT SERPL W P-5'-P-CCNC: 43 U/L (ref 12–78)
ANION GAP SERPL CALCULATED.3IONS-SCNC: 7 MMOL/L (ref 4–13)
AST SERPL W P-5'-P-CCNC: 27 U/L (ref 5–45)
BASOPHILS # BLD AUTO: 0.03 THOUSANDS/ΜL (ref 0–0.1)
BASOPHILS NFR BLD AUTO: 0 % (ref 0–1)
BILIRUB SERPL-MCNC: 0.4 MG/DL (ref 0.2–1)
BUN SERPL-MCNC: 3 MG/DL (ref 5–25)
CALCIUM SERPL-MCNC: 8.1 MG/DL (ref 8.3–10.1)
CHLORIDE SERPL-SCNC: 109 MMOL/L (ref 100–108)
CO2 SERPL-SCNC: 25 MMOL/L (ref 21–32)
CREAT SERPL-MCNC: 0.54 MG/DL (ref 0.6–1.3)
EOSINOPHIL # BLD AUTO: 0.72 THOUSAND/ΜL (ref 0–0.61)
EOSINOPHIL NFR BLD AUTO: 9 % (ref 0–6)
ERYTHROCYTE [DISTWIDTH] IN BLOOD BY AUTOMATED COUNT: 12.9 % (ref 11.6–15.1)
GFR SERPL CREATININE-BSD FRML MDRD: 130 ML/MIN/1.73SQ M
GLUCOSE SERPL-MCNC: 80 MG/DL (ref 65–140)
GLUCOSE SERPL-MCNC: 83 MG/DL (ref 65–140)
HCT VFR BLD AUTO: 32.9 % (ref 34.8–46.1)
HGB BLD-MCNC: 10.7 G/DL (ref 11.5–15.4)
IMM GRANULOCYTES # BLD AUTO: 0.01 THOUSAND/UL (ref 0–0.2)
IMM GRANULOCYTES NFR BLD AUTO: 0 % (ref 0–2)
LIPASE SERPL-CCNC: 2853 U/L (ref 73–393)
LYMPHOCYTES # BLD AUTO: 3.45 THOUSANDS/ΜL (ref 0.6–4.47)
LYMPHOCYTES NFR BLD AUTO: 44 % (ref 14–44)
MCH RBC QN AUTO: 30.9 PG (ref 26.8–34.3)
MCHC RBC AUTO-ENTMCNC: 32.5 G/DL (ref 31.4–37.4)
MCV RBC AUTO: 95 FL (ref 82–98)
MONOCYTES # BLD AUTO: 0.43 THOUSAND/ΜL (ref 0.17–1.22)
MONOCYTES NFR BLD AUTO: 5 % (ref 4–12)
NEUTROPHILS # BLD AUTO: 3.33 THOUSANDS/ΜL (ref 1.85–7.62)
NEUTS SEG NFR BLD AUTO: 42 % (ref 43–75)
NRBC BLD AUTO-RTO: 0 /100 WBCS
PLATELET # BLD AUTO: 241 THOUSANDS/UL (ref 149–390)
PMV BLD AUTO: 11.5 FL (ref 8.9–12.7)
POTASSIUM SERPL-SCNC: 3.6 MMOL/L (ref 3.5–5.3)
PROT SERPL-MCNC: 5.2 G/DL (ref 6.4–8.2)
RBC # BLD AUTO: 3.46 MILLION/UL (ref 3.81–5.12)
SODIUM SERPL-SCNC: 141 MMOL/L (ref 136–145)
WBC # BLD AUTO: 7.97 THOUSAND/UL (ref 4.31–10.16)

## 2018-09-10 PROCEDURE — 99239 HOSP IP/OBS DSCHRG MGMT >30: CPT | Performed by: INTERNAL MEDICINE

## 2018-09-10 PROCEDURE — 82948 REAGENT STRIP/BLOOD GLUCOSE: CPT

## 2018-09-10 PROCEDURE — 80053 COMPREHEN METABOLIC PANEL: CPT | Performed by: NURSE PRACTITIONER

## 2018-09-10 PROCEDURE — 85025 COMPLETE CBC W/AUTO DIFF WBC: CPT | Performed by: NURSE PRACTITIONER

## 2018-09-10 PROCEDURE — 83690 ASSAY OF LIPASE: CPT | Performed by: NURSE PRACTITIONER

## 2018-09-10 RX ADMIN — PANCRELIPASE 48000 UNITS: 24000; 76000; 120000 CAPSULE, DELAYED RELEASE PELLETS ORAL at 08:50

## 2018-09-10 RX ADMIN — ACETAMINOPHEN 650 MG: 325 TABLET, FILM COATED ORAL at 08:50

## 2018-09-10 RX ADMIN — PANCRELIPASE 72000 UNITS: 24000; 76000; 120000 CAPSULE, DELAYED RELEASE PELLETS ORAL at 12:08

## 2018-09-10 NOTE — ASSESSMENT & PLAN NOTE
Patient  Was admitted with acute pancreatitis after having a heavy meal   She was placed on IV fluids and was made NPO  Her pain was controlled with IV pain medications  She turned around  Rather quickly and expected this time with resolution of her pain and significant improvement in her elevated lipase levels  On day of discharge her abdominal soft and nontender  She tolerated peanut butter sandwiches and walnutbread prior to discharge  GI saw the patient who felt that patient's Creon should be increased to 3 pills 3 times a day and patient should [facility] Dr Imani Holt [facility]    I discussed the case with Dr Nikole Hugo  On the day of discharge on the phone in detail

## 2018-09-10 NOTE — DISCHARGE SUMMARY
Discharge- Tania Rivas 1990, 33 y o  female MRN: [id number]    Unit/Bed#: [address] [place] [address] Encounter: [id number]    Primary Care Provider: Siobhan Vazquez DO   Date and time admitted to hospital: 9/9/2018  2:14 AM        * Acute recurrent pancreatitis   Assessment & Plan    Patient  Was admitted with acute pancreatitis after having a heavy meal   She was placed on IV fluids and was made NPO  Her pain was controlled with IV pain medications  She turned around  Rather quickly and expected this time with resolution of her pain and significant improvement in her elevated lipase levels  On day of discharge her abdominal soft and nontender  She tolerated peanut butter sandwiches and walnutbread prior to discharge  GI saw the patient who felt that patient's Creon should be increased to 3 pills 3 times a day and patient should follow up with Dr José Soto for endoscopic ultrasound the [facility]  I discussed the case with Dr Shasha Zimmerman  On the day of discharge on the phone in detail        Diarrhea in adult patient   Assessment & Plan    Patient had no diarrhea since admission! No stool could be collected for C diff, cultures  Hypokalemia   Assessment & Plan    Hypokalemia resolved with potassium supplementation, potassium is 3 6 today                  Hospital Course:     Tania Rivas is a 33 y o  female patient who originally presented to the hospital on   Admission Orders     Ordered        09/09/18 0329  Inpatient Admission (expected length of stay for this patient is greater than two midnights)  Once            due to  Abdominal pain due to recurrent pancreatitis    Please see above list of diagnoses and related plan for additional information  Condition at Discharge:  good      Discharge instructions/Information to patient and family:   See after visit summary for information provided to patient and family        Provisions for Follow-Up Care:  See after visit summary for information related to follow-up care and any pertinent home health orders  Disposition:     Home       Discharge Statement:  I spent 33 minutes discharging the patient  This time was spent on the day of discharge  I had direct contact with the patient on the day of discharge  Greater than 50% of the total time was spent examining patient, answering all patient questions, arranging and discussing plan of care with patient as well as directly providing post-discharge instructions  Additional time then spent on discharge activities  Discharge Medications:  See after visit summary for reconciled discharge medications provided to patient and family        ** Please Note: This note has been constructed using a voice recognition system **

## 2018-09-10 NOTE — PROGRESS NOTES
James Zuniga  [id number]    33 y o   female      ASSESSMENT  1  Acute recurrent pancreatitis  Idiopathic  This represents her 5th admission since June  On workup thus far includes a negative ultrasound for gallstones, no choledocholithiasis, normal triglycerides, normal calcium and IgG4 level  No offending medications as her oral contraception (Lorrane Gallo) has been discontinued  She is not taking any medications  Denies tobacco   Denies heavy alcohol use  Had been experiencing some improvement with pancreatic enzymes  Possible microlithiasis or small pancreatic cyst   Symptoms improved  Tolerating a low-fat diet without difficulty    PLAN  1  Continue low-fat diet as tolerated  2  Increased Creon 3 tablets tablet t i d  with meals   3  Will discontinue IV fluids  4  An endoscopic ultrasound is to be arranged next month, with Dr Joan Yusuf  The patient was hoping this could be done sooner, however, I explained to the patient that due to the inflammation it most likely should not be done much sooner, as results of fine-needle aspiration biopsy, may be indeterminate      Chief Complaint   Patient presents with    Abdominal Pain     Pt presents to ED reporting upper, epigastric abdominal pain  Pain started at 11:30 PM yesterday  Pt reports pain at 8  Pt reports N/D         SUBJECTIVE/HPI   Tolerating low-fat diet without difficulty  Denies any abdominal pain  Denies any nausea or vomiting  Denies any fevers or chills  Denies any GI complaints  No further diarrhea since admission      /63 (BP Location: Right arm)   Pulse 82   Temp 98 6 °F (37 °C) (Tympanic)   Resp 18   Ht 5' 3" (1 6 m)   Wt 68 5 kg (151 lb 0 2 oz)   SpO2 97%   BMI 26 75 kg/m²       PHYSICALEXAM  Constitutional:  Well developed, well nourished, no acute distress, non-toxic appearance   Eyes:   conjunctiva normal   HENT:  Atraumatic  Respiratory:  No respiratory distress  Cardiovascular:  Normal rate  GI:  Soft, nondistended, normal bowel sounds, nontender  Musculoskeletal:  No edema  Neurologic:  Alert & oriented x 3  Psychiatric:  Speech and behavior appropriate       Lab Results   Component Value Date    CALCIUM 8 1 (L) 09/10/2018     09/10/2018    K 3 6 09/10/2018    CO2 25 09/10/2018     (H) 09/10/2018    BUN 3 (L) 09/10/2018    CREATININE 0 54 (L) 09/10/2018     Lab Results   Component Value Date    WBC 7 97 09/10/2018    HGB 10 7 (L) 09/10/2018    HCT 32 9 (L) 09/10/2018    MCV 95 09/10/2018     09/10/2018     Lab Results   Component Value Date    ALT 43 09/10/2018    AST 27 09/10/2018    ALKPHOS 65 09/10/2018     Lab Results   Component Value Date    AMYLASE 580 ([facility]) 07/31/2018     Lab Results   Component Value Date    LIPASE 2,853 (H) 09/10/2018     No results found for: IRON, TIBC, FERRITIN  Lab Results   Component Value Date    INR 0 98 09/09/2018       Counseling / Coordination of Care  Total floor / unit time spent today 25 minutes  Greater than 50% of total time was spent with the patient and / or family counseling and / or coordination of care  A description of the counseling / coordination of care: Leilani Burciaga

## 2018-09-10 NOTE — PROGRESS NOTES
Progress Note - Jeronimo Hart 1990, 33 y o  female MRN: [id number]    Unit/Bed#: [address] [place] [address] Encounter: [id number]    Primary Care Provider: Otoniel Lara DO   Date and time admitted to hospital: 2018  2:14 AM        * Acute recurrent pancreatitis   Assessment & Plan    Patient's abdominal pain resolved and she tolerated liquid diet  Her diet is advanced to low fat  As long as she tolerates that for lunch will discharge her later today        Diarrhea in adult patient   Assessment & Plan    Patient had no diarrhea since admission! No stool could be collected for C diff cultures  She only had at home        Hypokalemia   Assessment & Plan    Hypokalemia resolved with potassium supplementation, potassium is 3 6 today            VTE Prophylaxis: in place    Patient Centered Rounds: I rounded with patient's nurse    Current Length of Stay: 1 day(s)    Current Patient Status: Inpatient    Certification Statement: Pt requires additional inpatient hospital stay due to: see assessment and plan        Subjective:   Patient denies any nausea, abdominal pain, diarrhea, chest pain, shortness of breath this morning  She tolerated liquid diet yesterday without increasing abdominal pain    All other ROS are negative    Objective:     Vitals:   Temp (24hrs), Av 2 °F (36 8 °C), Min:98 °F (36 7 °C), Max:98 6 °F (37 °C)    HR:  [75-82] 82  Resp:  [18] 18  BP: (106-128)/(58-63) 128/63  SpO2:  [97 %-98 %] 97 %  Body mass index is 26 75 kg/m²  Input and Output Summary (last 24 hours): Intake/Output Summary (Last 24 hours) at 09/10/18 1044  Last data filed at 18 1327   Gross per 24 hour   Intake           2237 5 ml   Output                0 ml   Net           2237 5 ml       Physical Exam:     Physical Exam   Constitutional: She is oriented to person, place, and time  She appears well-developed  No distress  HENT:   Head: Normocephalic     Mouth/Throat: Oropharynx is clear and moist    Eyes: Conjunctivae are normal    Neck: Neck supple  Cardiovascular: Normal rate and regular rhythm  Pulmonary/Chest: Effort normal  No respiratory distress  She has no wheezes  She has no rales  Abdominal: Soft  Bowel sounds are normal  She exhibits no distension  There is no tenderness  Musculoskeletal: She exhibits no tenderness  Lymphadenopathy:     She has no cervical adenopathy  Neurological: She is alert and oriented to person, place, and time  No cranial nerve deficit  Skin: Skin is warm and dry  No rash noted  Psychiatric: She has a normal mood and affect  Vitals reviewed  I personally reviewed labs and imaging reports for today  Last 24 Hours Medication List:     Current Facility-Administered Medications:  acetaminophen 650 mg Oral Q6H PRN CARLOS Lund   HYDROmorphone 1 mg Intravenous Q3H PRN CARLOS Medrano   ondansetron 4 mg Intravenous Q6H PRN CARLOS Jones   pancrelipase (Lip-Prot-Amyl) 72,000 Units Oral TID With Meals CARLOS Bhat          Today, Patient Was Seen By: Steve Yang MD    ** Please Note: Dictation voice to text software may have been used in the creation of this document   **

## 2018-09-10 NOTE — SOCIAL WORK
Met with patient  Explained role of care management  Patient lives with [address] in a third floor apartment  She is independent adl's, drives, works FT  DME - denies  Past services - denies  She plans on returning home at discharge and does not anticipate any discharge needs  Will follow

## 2018-09-10 NOTE — ASSESSMENT & PLAN NOTE
Patient had no diarrhea since admission! No stool could be collected for C diff cultures    She only had at home

## 2018-09-10 NOTE — ASSESSMENT & PLAN NOTE
Patient's abdominal pain resolved and she tolerated liquid diet  Her diet is advanced to low fat    As long as she tolerates that for lunch will discharge her later today

## 2018-09-11 NOTE — CASE MANAGEMENT
Auth:    [id number]      Notification of Discharge  This is a Notification of Discharge from our facility [address]  Please be advised that this patient has been discharge from our facility  Below you will find the admission and discharge date and time including the patients disposition  PRESENTATION DATE: 9/9/2018  2:14 AM  IP ADMISSION DATE: 9/9/18 0330  DISCHARGE DATE: 9/10/2018  4:30 PM  DISPOSITION: Home/Self Care    [address] [facility] in the [facility] by [facility] Utilization Review Department  Phone: [phone]; Fax [phone]  ATTENTION: The Network Utilization Review Department is now centralized for our 9 Facilities  Make a note that we have a new phone and fax numbers for our Department  Please call with any questions or concerns to [phone] and carefully follow the prompts so that you are directed to the right person  All voicemails are confidential  Fax any determinations, approvals, denials, and requests for initial or continue stay review clinical to [phone]  Due to HIGH CALL volume, it would be easier if you could please send faxed requests to expedite your requests and in part, help us provide discharge notifications faster

## 2018-10-08 DIAGNOSIS — K85.90 ACUTE RECURRENT PANCREATITIS: ICD-10-CM

## 2018-10-08 RX ORDER — PANCRELIPASE 24000; 76000; 120000 [USP'U]/1; [USP'U]/1; [USP'U]/1
72000 CAPSULE, DELAYED RELEASE PELLETS ORAL
Qty: 270 CAPSULE | Refills: 0 | OUTPATIENT
Start: 2018-10-08

## 2018-10-11 ENCOUNTER — TELEPHONE (OUTPATIENT)
Dept: GASTROENTEROLOGY | Facility: AMBULARY SURGERY CENTER | Age: 28
End: 2018-10-11

## 2018-10-11 NOTE — TELEPHONE ENCOUNTER
Dr Zelaya's pt called requesting to cancel procedure that is sched for 10/17/18   Pt does not want to resched as she has already went to another facility

## 2025-04-21 NOTE — ED NOTES
Called [facility] to give report     Margarito Orellana RN  06/02/18 3322
Patient is resting comfortably       Kianna Link RN  06/02/18 0985
Per Joanna Veliz in the lab, pt lipase needs to be manually dilatated  Time to result extended       Jed Santillan RN  06/02/18 2047
Provider at bedside       Del Richmond RN  06/02/18 0988
Pt ambulated to restroom   Pt given urine specimen cup     Adri Lundy RN  06/02/18 1939
Pt aware of admission     Lang Castaneda RN  06/02/18 1962
Pt reports "the abdominal pain comes in waves, but it feels okay right now"  Boyfriend at the bedside     Loni Mar RN  06/02/18 2120
Pt resting in bed     Frances Carrera RN  06/02/18 1956
Pt returned from [facility]  06/02/18 6450
Pt states "I am feeling a lot better after Zofran"  Pt awake, alert, and pleasant upon approach       Vazquez Little RN  06/02/18 2020
Pt updated on plan of care     Perry Pires RN  06/02/18 2021
no concerns